# Patient Record
Sex: FEMALE | Race: WHITE | NOT HISPANIC OR LATINO | Employment: FULL TIME | URBAN - METROPOLITAN AREA
[De-identification: names, ages, dates, MRNs, and addresses within clinical notes are randomized per-mention and may not be internally consistent; named-entity substitution may affect disease eponyms.]

---

## 2017-02-23 ENCOUNTER — OFFICE VISIT (OUTPATIENT)
Dept: GASTROENTEROLOGY | Facility: CLINIC | Age: 32
End: 2017-02-23

## 2017-02-23 VITALS
HEIGHT: 69 IN | SYSTOLIC BLOOD PRESSURE: 128 MMHG | WEIGHT: 195 LBS | BODY MASS INDEX: 28.88 KG/M2 | DIASTOLIC BLOOD PRESSURE: 74 MMHG

## 2017-02-23 DIAGNOSIS — K59.04 CHRONIC IDIOPATHIC CONSTIPATION: Primary | ICD-10-CM

## 2017-02-23 DIAGNOSIS — R10.13 DYSPEPSIA: ICD-10-CM

## 2017-02-23 PROCEDURE — 99213 OFFICE O/P EST LOW 20 MIN: CPT | Performed by: INTERNAL MEDICINE

## 2017-02-23 NOTE — PROGRESS NOTES
Chief Complaint   Patient presents with   • Heartburn   • Constipation       Jenna Jones is a  32 y.o. female here for a follow up visit for heartburn and constipation. She has not had any further episodes of the band like pain.  She has taken protonix daily - unsure if it is helping.  Intermittent upper abd discomfort - recent episode involving chest discomfort. No n/v.  No fredrick heartburn.  No improvement in bowel frequency with amitiza 8 mcg bid.  Has a small BM if she drinks coffee but not much volume.  HPI  Past Medical History   Diagnosis Date   • Anxiety    • Asthma    • Chronic constipation    • Depression    • GERD (gastroesophageal reflux disease)    • H/O CT scan of abdomen 07/05/2016     unremaarkable-mild constipation     Past Surgical History   Procedure Laterality Date   • Knee surgery     • Rotator cuff repair         Current Outpatient Prescriptions:   •  buPROPion SR (WELLBUTRIN SR) 150 MG 12 hr tablet, TAKE 1 TABLET BY MOUTH TWICE DAILY OR AS DIRECTED, Disp: , Rfl: 0  •  hyoscyamine (LEVSIN) 0.125 MG SL tablet, Take 1 tablet by mouth 4 (Four) Times a Day With Meals & at Bedtime., Disp: 50 tablet, Rfl: 2  •  lubiprostone (AMITIZA) 8 MCG capsule, Take 1 capsule by mouth 2 (Two) Times a Day With Meals., Disp: 60 capsule, Rfl: 3  •  omeprazole (PriLOSEC) 20 MG capsule, TAKE ONE CAPSULE BY MOUTH EVERY DAY, Disp: , Rfl: 0  •  PROAIR  (90 BASE) MCG/ACT inhaler, INHALE TWO PUFFS BY MOUTH EVERY SIX HOURS AS NEEDED, Disp: , Rfl: 5  •  nitrofurantoin, macrocrystal-monohydrate, (MACROBID) 100 MG capsule, TAKE ONE CAPSULE BY MOUTH TWICE A DAY FOR 7 DAYS, Disp: , Rfl: 0  PRN Meds:.  Allergies   Allergen Reactions   • Adhesive Tape      Social History     Social History   • Marital status:      Spouse name: N/A   • Number of children: N/A   • Years of education: N/A     Occupational History   • Not on file.     Social History Main Topics   • Smoking status: Never Smoker   • Smokeless  tobacco: Not on file   • Alcohol use Yes      Comment: social   • Drug use: No   • Sexual activity: Defer     Other Topics Concern   • Not on file     Social History Narrative     Family History   Problem Relation Age of Onset   • Hypertension Other    • Diabetes Other    • Stomach cancer Other    • Prostate cancer Other    • Colon polyps Mother    • Green's esophagus Father    • Colon polyps Maternal Aunt    • Colon cancer Maternal Uncle    • Colon cancer Maternal Grandmother    • Colon polyps Maternal Grandmother    • Colon cancer Cousin    • Stomach cancer Paternal Grandfather      Review of Systems- reviewed and neg except hpi  Vitals:    02/23/17 1327   BP: 128/74     Last Weight    02/23/17  1327   Weight: 195 lb (88.5 kg)     Physical Exam   Constitutional: She is oriented to person, place, and time. She appears well-developed and well-nourished.   HENT:   Head: Normocephalic and atraumatic.   Eyes: Conjunctivae are normal. No scleral icterus.   Neck: Normal range of motion.   Pulmonary/Chest: Effort normal.   Abdominal: She exhibits no distension.   Musculoskeletal: She exhibits no edema.   Neurological: She is alert and oriented to person, place, and time.   Skin: No pallor.   Psychiatric: She has a normal mood and affect.   Nursing note and vitals reviewed.    No images are attached to the encounter.  Diagnoses and all orders for this visit:    Chronic idiopathic constipation    Dyspepsia    Plan:  - smaples of amitiza 24 mcg bid given - advised pt to call with progress report in a few weeks  - continue pantoprazole  - levsin prn

## 2017-02-27 ENCOUNTER — OFFICE VISIT (OUTPATIENT)
Dept: ORTHOPEDIC SURGERY | Facility: CLINIC | Age: 32
End: 2017-02-27

## 2017-02-27 VITALS — BODY MASS INDEX: 31.61 KG/M2 | HEIGHT: 67 IN | TEMPERATURE: 98.3 F | WEIGHT: 201.4 LBS

## 2017-02-27 DIAGNOSIS — G89.29 CHRONIC PAIN OF LEFT KNEE: Primary | ICD-10-CM

## 2017-02-27 DIAGNOSIS — M25.562 CHRONIC PAIN OF LEFT KNEE: Primary | ICD-10-CM

## 2017-02-27 PROCEDURE — 99212 OFFICE O/P EST SF 10 MIN: CPT | Performed by: ORTHOPAEDIC SURGERY

## 2017-02-27 RX ORDER — PANTOPRAZOLE SODIUM 40 MG/1
40 TABLET, DELAYED RELEASE ORAL
Status: ON HOLD | COMMUNITY
Start: 2016-09-03 | End: 2018-07-25

## 2017-04-13 ENCOUNTER — OFFICE VISIT (OUTPATIENT)
Dept: ORTHOPEDIC SURGERY | Facility: CLINIC | Age: 32
End: 2017-04-13

## 2017-04-13 VITALS — WEIGHT: 200 LBS | HEIGHT: 69 IN | BODY MASS INDEX: 29.62 KG/M2 | TEMPERATURE: 97.9 F

## 2017-04-13 DIAGNOSIS — M79.605 PAIN IN BOTH LOWER EXTREMITIES: Primary | ICD-10-CM

## 2017-04-13 DIAGNOSIS — M79.604 PAIN IN BOTH LOWER EXTREMITIES: Primary | ICD-10-CM

## 2017-04-13 PROCEDURE — 99213 OFFICE O/P EST LOW 20 MIN: CPT | Performed by: ORTHOPAEDIC SURGERY

## 2017-04-13 NOTE — PROGRESS NOTES
"Bilateral Knee MRI Follow Up      Patient: Jenna Jones        YOB: 1985            Chief Complaints: bilateral Knee pain      History of Present Illness: The patient is here follow-up of bilateral knee pain.  We last saw the head more patellofemoral she was getting back into exercise training stronger get her weight down she had presentation of pain in her thighs medially and anteriorly.  She states it feels like they're pulling and tight.  She does describe some cramping as well.  No new history injury change in activity other than the working out.  No gross swelling in her knees she states she did have some thigh swelling after her knee arthroscopy by Dr. Fernandes but a Doppler was done which was negative.  She does have a significant family history with multiple family members with ALS and she is concerned that this could be neurologic and some fashion      Physical Exam: 32 y.o. female  General Appearance:    Alert, cooperative, in no acute distress                   Vitals:    04/13/17 0929   Temp: 97.9 °F (36.6 °C)   Weight: 200 lb (90.7 kg)   Height: 69\" (175.3 cm)        Patient is alert and read ×3 no acute distress appears her above-listed at height weight and age.  Affect is normal respiratory rate is normal unlabored. Heart rate regular rate rhythm, sclera, dentition and hearing are normal for the purpose of this exam.      Ortho Exam Physical exam of the right knee reveals no effusion, no erythema.  The patient has no palpable tenderness along the medial joint line, no tenderness about the lateral joint line.  Patient does have crepitus with patellofemoral range of motion.  They also have subjective symptoms anteriorly during exam.  The patient has a negative bounce home, negative Bunny and a stable ligamentous exam.  Quad tone is reasonable and symmetric.  There are no overlying skin changes no lymphedema no lymphadenopathy.  There is good hip range of motion which is full " symmetric and asymptomatic and a normal ankle exam.  Hamstrings and IT band are tight bilaterally.    Physical exam of the left knee reveals no effusion, no erythema.  The patient has no palpable tenderness along the medial joint line, no tenderness about the lateral joint line.  Patient does have crepitus with patellofemoral range of motion.  They also have subjective symptoms anteriorly during exam.  The patient has a negative bounce home, negative Bunny and a stable ligamentous exam.  Quad tone is reasonable and symmetric.  There are no overlying skin changes no lymphedema no lymphadenopathy.  There is good hip range of motion which is full symmetric and asymptomatic and a normal ankle exam.  Hamstrings and IT band are tight bilaterally.            Procedures      Assessment/Plan:  Bilateral leg pain seems to be more abductor in origin she does has some tightness with stretching would like her to do see physical therapy to see if we can alleviate some of her symptoms I suspect she just did too much exercise got tight and now spasm.  If therapy does not relieve it I would consider an EMG I will see her back in 4 weeks

## 2017-10-02 ENCOUNTER — OFFICE VISIT (OUTPATIENT)
Dept: GASTROENTEROLOGY | Facility: CLINIC | Age: 32
End: 2017-10-02

## 2017-10-02 VITALS
DIASTOLIC BLOOD PRESSURE: 70 MMHG | TEMPERATURE: 98.1 F | BODY MASS INDEX: 29.95 KG/M2 | HEIGHT: 69 IN | SYSTOLIC BLOOD PRESSURE: 114 MMHG | WEIGHT: 202.2 LBS

## 2017-10-02 DIAGNOSIS — R10.13 EPIGASTRIC PAIN: Primary | ICD-10-CM

## 2017-10-02 DIAGNOSIS — R68.81 EARLY SATIETY: ICD-10-CM

## 2017-10-02 DIAGNOSIS — R11.0 NAUSEA: ICD-10-CM

## 2017-10-02 PROCEDURE — 99214 OFFICE O/P EST MOD 30 MIN: CPT | Performed by: NURSE PRACTITIONER

## 2017-10-02 RX ORDER — MELOXICAM 7.5 MG/1
7.5 TABLET ORAL DAILY
COMMUNITY
End: 2018-03-20

## 2017-10-02 NOTE — PROGRESS NOTES
"Chief Complaint   Patient presents with   • Nausea   • GI Problem     HPI    32-year-old female patient of Dr. Duarte's last evaluated in the office in February 2017.  Followed for history of GERD and prior issues related to constipation.  At her last office visit patient had ongoing issues with constipation and dose of Amitiza was increased to 24 µg twice daily.  GERD symptoms at that time were well controlled with daily PPI.    Patient presents today complaining of nausea, early satiety and bloating in the upper abdomen as well as a persistent sensation of \"something being stuck in my throat\" that it been going on for approximately 2 weeks.  She can identify no inciting event for the onset of symptoms.  She is compliant with her daily PPI.  She does report that the symptoms are better over the last 24-48 hours.  She has taken no over-the-counter medications for her symptoms.  She is currently experiencing seeing some issues related to allergies and is noted to be coughing and has some nasal congestion evident at the time of her visit.  She reports that these symptoms were present prior to the onset of her GI complaints.  She has had no vomiting, no dysphagia and no odynophagia associated with the symptoms.  She does report starting Mobic prescribed by her orthopedist for chronic knee pain.  She has been taking the medication more consistently over the last couple of weeks then she had previously which may be a contributing factor to the increase in GI symptoms.    With regard to her chronic constipation patient states that she is no longer needing to take the Amitiza.  She is having bowel movements daily with use of drinking a cup of coffee in the mornings.  She has no lower intestinal complaints.    Past Medical History:   Diagnosis Date   • Anxiety    • Asthma    • Chronic constipation    • Depression    • GERD (gastroesophageal reflux disease)    • H/O CT scan of abdomen 07/05/2016    unremaarkable-mild " constipation     Past Surgical History:   Procedure Laterality Date   • KNEE SURGERY     • ROTATOR CUFF REPAIR         Current Outpatient Prescriptions   Medication Sig Dispense Refill   • buPROPion SR (WELLBUTRIN SR) 150 MG 12 hr tablet TAKE 1 TABLET BY MOUTH TWICE DAILY OR AS DIRECTED  0   • meloxicam (MOBIC) 7.5 MG tablet Take 7.5 mg by mouth Daily.     • pantoprazole (PROTONIX) 40 MG EC tablet Take 40 mg by mouth.     • PROAIR  (90 BASE) MCG/ACT inhaler INHALE TWO PUFFS BY MOUTH EVERY SIX HOURS AS NEEDED  5     No current facility-administered medications for this visit.        PRN Meds:.    Allergies   Allergen Reactions   • Adhesive Tape        Social History     Social History   • Marital status:      Spouse name: N/A   • Number of children: N/A   • Years of education: N/A     Occupational History   • Not on file.     Social History Main Topics   • Smoking status: Never Smoker   • Smokeless tobacco: Not on file   • Alcohol use Yes      Comment: social   • Drug use: No   • Sexual activity: Defer     Other Topics Concern   • Not on file     Social History Narrative       Family History   Problem Relation Age of Onset   • Hypertension Other    • Diabetes Other    • Stomach cancer Other    • Prostate cancer Other    • Colon polyps Mother    • Green's esophagus Father    • Colon polyps Maternal Aunt    • Colon cancer Maternal Uncle    • Colon cancer Maternal Grandmother    • Colon polyps Maternal Grandmother    • Colon cancer Cousin    • Stomach cancer Paternal Grandfather    • No Known Problems Sister    • No Known Problems Brother    • No Known Problems Paternal Aunt    • No Known Problems Paternal Uncle    • No Known Problems Maternal Grandfather    • No Known Problems Paternal Grandmother    • Celiac disease Neg Hx    • Cirrhosis Neg Hx    • Crohn's disease Neg Hx    • Cystic fibrosis Neg Hx    • Esophageal cancer Neg Hx    • Hemochromatosis Neg Hx    • Inflammatory bowel disease Neg Hx    •  "Irritable bowel syndrome Neg Hx    • Liver cancer Neg Hx    • Liver disease Neg Hx    • Rectal cancer Neg Hx    • Ulcerative colitis Neg Hx    • Herber's disease Neg Hx    • Alcohol abuse Neg Hx    • Pancreatitis Neg Hx        Review of Systems   Constitutional: Negative for activity change, appetite change and unexpected weight change.   HENT: Negative for trouble swallowing.         Cough, chest congestion  Post nasal drip   Gastrointestinal: Positive for nausea. Negative for abdominal distention, abdominal pain and vomiting.        Early satiety  Upper abdominal bloating   Musculoskeletal: Positive for arthralgias.   Allergic/Immunologic: Negative for immunocompromised state.       Vitals:    10/02/17 0929   BP: 114/70   Temp: 98.1 °F (36.7 °C)     /70 (BP Location: Left arm, Patient Position: Sitting)  Temp 98.1 °F (36.7 °C) (Oral)   Ht 69\" (175.3 cm)  Wt 202 lb 3.2 oz (91.7 kg)  BMI 29.86 kg/m2       Physical Exam   Constitutional: She is oriented to person, place, and time. She appears well-developed and well-nourished. No distress.   HENT:   Head: Normocephalic and atraumatic.   Mouth/Throat: Oropharynx is clear and moist.   Eyes: No scleral icterus.   Neck: No thyromegaly present.   Cardiovascular: Normal rate and regular rhythm.    Pulmonary/Chest: Effort normal and breath sounds normal.   Abdominal: Soft. Bowel sounds are normal. She exhibits no distension. There is no tenderness. There is rebound. There is no guarding.   Neurological: She is alert and oriented to person, place, and time.   Skin: Skin is warm and dry.   Psychiatric: She has a normal mood and affect.   Vitals reviewed.      ASSESSMENT AND PLAN    Jenna was seen today for nausea and gi problem.    Diagnoses and all orders for this visit:    Epigastric pain  Comments:  x 2 weeks, some nausea, early satiety, bloated upper abdomen, globus sensation.  Compliant with daily PPI.  No dysphagia or odynophagia.  Orders:  -     Case " Request; Standing  -     Case Request    Early satiety  -     Case Request; Standing  -     Case Request    Nausea  -     Case Request; Standing  -     Case Request    Other orders  -     Obtain informed consent; Standing    Patient will continue her current PPI.  We will schedule her for an EGD but if symptoms continue to improve she is certainly welcome to cancel the procedure and follow-up on an as-needed basis for any recurrence.  While she is on prescription NSAIDs I have encouraged her to take the medications with food and at as low doses as possible to avoid any GI related issues         DERRELL Hernandes   [facility]  [address] [place] [address]  Office: [phone]

## 2018-01-22 ENCOUNTER — OFFICE VISIT (OUTPATIENT)
Dept: ORTHOPEDIC SURGERY | Facility: CLINIC | Age: 33
End: 2018-01-22

## 2018-01-22 DIAGNOSIS — M25.562 CHRONIC PAIN OF LEFT KNEE: Primary | ICD-10-CM

## 2018-01-22 DIAGNOSIS — G89.29 CHRONIC PAIN OF LEFT KNEE: Primary | ICD-10-CM

## 2018-01-22 DIAGNOSIS — S83.282D TEAR OF LATERAL MENISCUS OF LEFT KNEE, CURRENT, UNSPECIFIED TEAR TYPE, SUBSEQUENT ENCOUNTER: ICD-10-CM

## 2018-01-22 PROCEDURE — 73562 X-RAY EXAM OF KNEE 3: CPT | Performed by: ORTHOPAEDIC SURGERY

## 2018-01-22 PROCEDURE — 99213 OFFICE O/P EST LOW 20 MIN: CPT | Performed by: ORTHOPAEDIC SURGERY

## 2018-01-22 NOTE — PROGRESS NOTES
Knee Follow Up      Patient: Jenna Jones        YOB: 1985            Chief Complaints: knee pain bilaterally left greater than right also some left hip pain      History of Present Illness: Patient is here follow-up of bilateral knee pain we did undertake conservative management she she's really no better in fact her symptoms are somewhat worse moderate to severe intermittent she was complaining of some numbness and tingling she had a little bit this left I do not think is related a way to her knee could be related to her back we discussed options including seeing Dr. Porter a neurologist and/or getting a nerve test      Physical Exam: 32 y.o. female  General Appearance:    Alert, cooperative, in no acute distress                 There were no vitals filed for this visit.     Patient is alert and read ×3 no acute distress appears her above-listed at height weight and age.  Affect is normal respiratory rate is normal unlabored. Heart rate regular rate rhythm, sclera, dentition and hearing are normal for the purpose of this exam.      Ortho Exam     Physical exam of the right knee reveals no effusion, no erythema.  The patient has no palpable tenderness along the medial joint line, no tenderness about the lateral joint line.  Patient does have crepitus with patellofemoral range of motion.  They also have subjective symptoms anteriorly during exam.  The patient has a negative bounce home, negative Bunny and a stable ligamentous exam.  Quad tone is reasonable and symmetric.  There are no overlying skin changes no lymphedema no lymphadenopathy.  There is good hip range of motion which is full symmetric and asymptomatic and a normal ankle exam.  Hamstrings and IT band are tight bilaterally.    Physical exam of the left knee reveals no effusion no redness.  The patient does have tenderness about the Lateral joint line.  Minimal tenderness about the medial joint line.  A negative bounce home and a  positive medial Bunny.  There is some pain medially  with a lateral Bunny.  Patient has a stable ligamentous exam.  Quads are reasonable and symmetric bilaterally.  Calf is soft and nontender.  There is no overlying skin changes no lymphedema lymphadenopathy.  Patient has good hip range of motion full symmetric and asymptomatic and a normal ankle exam.      X-rays AP lateral merchant view left knee were taken to evaluate her symptoms and have have compared to previous films.  She does have degenerative changes laterally right is greater than left with squaring off of the lateral femoral condyle she also has patella baja.  No evidence of any acute bony pathology      Assessment/Plan:      Persistent left knee pain despite conservative measures I'm concerned about meniscal pathology plan is to proceed with an MRI of her follow-up after that test

## 2018-02-01 ENCOUNTER — TELEPHONE (OUTPATIENT)
Dept: ORTHOPEDIC SURGERY | Facility: CLINIC | Age: 33
End: 2018-02-01

## 2018-02-27 ENCOUNTER — TELEPHONE (OUTPATIENT)
Dept: ORTHOPEDIC SURGERY | Facility: CLINIC | Age: 33
End: 2018-02-27

## 2018-03-01 NOTE — TELEPHONE ENCOUNTER
I think I would have her see Dr. Porter if we could set that appointment up her we did a nerve test  
LEFT MESSAGE FOR PT TO CALL BACK MG  
SPOKE WITH PT IN DETAIL RESULTS MG  
IV

## 2018-03-20 ENCOUNTER — CONSULT (OUTPATIENT)
Dept: ORTHOPEDIC SURGERY | Facility: CLINIC | Age: 33
End: 2018-03-20

## 2018-03-20 VITALS — TEMPERATURE: 97.9 F | BODY MASS INDEX: 33.06 KG/M2 | HEIGHT: 69 IN | WEIGHT: 223.2 LBS

## 2018-03-20 DIAGNOSIS — M54.50 LUMBAR SPINE PAIN: Primary | ICD-10-CM

## 2018-03-20 DIAGNOSIS — M54.5 CHRONIC LOW BACK PAIN, UNSPECIFIED BACK PAIN LATERALITY, WITH SCIATICA PRESENCE UNSPECIFIED: ICD-10-CM

## 2018-03-20 DIAGNOSIS — G89.29 CHRONIC LOW BACK PAIN, UNSPECIFIED BACK PAIN LATERALITY, WITH SCIATICA PRESENCE UNSPECIFIED: ICD-10-CM

## 2018-03-20 PROCEDURE — 72100 X-RAY EXAM L-S SPINE 2/3 VWS: CPT | Performed by: ORTHOPAEDIC SURGERY

## 2018-03-20 PROCEDURE — 99213 OFFICE O/P EST LOW 20 MIN: CPT | Performed by: ORTHOPAEDIC SURGERY

## 2018-03-20 RX ORDER — FERROUS SULFATE 325(65) MG
325 TABLET ORAL
COMMUNITY
End: 2019-12-16

## 2018-03-20 NOTE — PROGRESS NOTES
New patient or new problem visit    Chief Complaint   Patient presents with   • Lumbar Spine - Establish Care, Pain       HPI: She complains of chronic low back pain occasionally radiating to the leg moderate constant aching worse with activity.  Physical therapy a year ago did not help    PFSH: See chart- reviewed    Review of Systems   Constitutional: Negative for activity change, appetite change, chills, diaphoresis, fatigue, fever and unexpected weight change.   HENT: Negative for congestion, hearing loss, nosebleeds, postnasal drip, sore throat, tinnitus and trouble swallowing.    Eyes: Negative for pain and visual disturbance.   Respiratory: Negative for apnea, cough, chest tightness, shortness of breath and wheezing.    Cardiovascular: Negative for chest pain and palpitations.   Gastrointestinal: Positive for abdominal pain. Negative for blood in stool, diarrhea, nausea and vomiting.   Genitourinary: Negative for difficulty urinating and dysuria.   Musculoskeletal: Positive for arthralgias, back pain, joint swelling and myalgias.   Skin: Negative for color change and rash.   Neurological: Positive for numbness. Negative for light-headedness and headaches.   Hematological: Bruises/bleeds easily.   Psychiatric/Behavioral: Negative for agitation and sleep disturbance. The patient is not nervous/anxious.        PE: Constitutional: Vital signs above-noted.  Awake, alert and oriented    Psychiatric: Affect and insight do not appear grossly disturbed.    Pulmonary: Breathing is unlabored, color is good.    Skin: Warm, dry and normal turgor    Cardiac:  pedal pulses intact.  No edema.    Eyesight and hearing appear adequate for examination purposes      Musculoskeletal:  There is no tenderness to percussion and palpation of the spine. Motion appears undisturbed.  Posture is unremarkable to coronal and sagittal inspection.    The skin about the area is intact.  There is no palpable or visible deformity.  There is no  local spasm.       Neurologic:   Reflexes are 2+ and symmetrical in the patellae unobtainable in the Achilles.   Motor function is undisturbed in quadriceps, EHL, and gastrocnemius      Sensation appears symmetrically intact to light touch   .  In the bilateral lower extremities there is no evidence of atrophy.   Clonus is absent..  Gait appears undisturbed.  SLR test negative      MEDICAL DECISION MAKING    XRAY: Plain film x-rays of the lumbar spine show some slight disc space narrowing at L5-S1, a little bit less at L4 5.  No comparison views are available.    Other: n/a    Impression: Intractable lumbar back pain which she states is the worst pain she has.    Plan: This point is get an MRI scan of the lumbar spine with perhaps with night toward epidural injections which I explained.

## 2018-03-28 ENCOUNTER — TELEPHONE (OUTPATIENT)
Dept: ORTHOPEDIC SURGERY | Facility: CLINIC | Age: 33
End: 2018-03-28

## 2018-03-28 NOTE — TELEPHONE ENCOUNTER
If this is for the MRI of her back we please asked Dr. Porter what he wants to do with regard to therapy

## 2018-03-28 NOTE — TELEPHONE ENCOUNTER
Yes. She would like a referral for Pt.  She also asked that I document that she had GI problems from [facility], so gastro doc told her to stop it.  She can, however, tolerate ibuprofen.She wanted this documented as part of her conservative care requirements from [place].,    She asked me to mail her the order for Pt.

## 2018-03-29 ENCOUNTER — TELEPHONE (OUTPATIENT)
Dept: ORTHOPEDIC SURGERY | Facility: CLINIC | Age: 33
End: 2018-03-29

## 2018-03-29 DIAGNOSIS — M25.569 ACUTE KNEE PAIN, UNSPECIFIED LATERALITY: Primary | ICD-10-CM

## 2018-03-29 DIAGNOSIS — M54.50 LUMBAR SPINE PAIN: Primary | ICD-10-CM

## 2018-03-29 NOTE — TELEPHONE ENCOUNTER
She also asked Dr. Woodall to give her a referral for her lumbar spine, as [place] also requires conservative care for that.      He put in his referral, but I need one from you for her knee.      She requested I mail these to her, and she is not going thru the [facility] system for her Pt. She has a physical therapist elsewhere.

## 2018-04-06 ENCOUNTER — TELEPHONE (OUTPATIENT)
Dept: GASTROENTEROLOGY | Facility: CLINIC | Age: 33
End: 2018-04-06

## 2018-04-06 NOTE — TELEPHONE ENCOUNTER
----- Message from Miguel Craven sent at 4/6/2018  8:08 AM EDT -----  Regarding: NOT FEELING WELL   Contact: [phone]  PT CALLED COMPLAIN OF CHANGES IN BOWELS AND ASKING FOR A EGD

## 2018-04-06 NOTE — TELEPHONE ENCOUNTER
"Call from pt.  States PCP started her on iron because of anemia/fatigue.  Also started on Amoxicillin last week 2nd sinus infection.  Notes that BM has layer of \"grit\" - normal brown color.     States would like to proceed with EGD as instructed with o/v of 10/2/17.  Asking if should also have c/s.    Advise pt that DR Duarte out of office - will send message.  Verb understanding.  "

## 2018-04-12 NOTE — TELEPHONE ENCOUNTER
Please see if she can be worked in with [facility] for further evaluation of her symptoms prior to scheduling any further endoscopic procedures.

## 2018-06-05 ENCOUNTER — OFFICE VISIT (OUTPATIENT)
Dept: GASTROENTEROLOGY | Facility: CLINIC | Age: 33
End: 2018-06-05

## 2018-06-05 VITALS
SYSTOLIC BLOOD PRESSURE: 122 MMHG | WEIGHT: 226 LBS | HEIGHT: 69 IN | TEMPERATURE: 98.3 F | DIASTOLIC BLOOD PRESSURE: 78 MMHG | BODY MASS INDEX: 33.47 KG/M2

## 2018-06-05 DIAGNOSIS — K21.9 GASTROESOPHAGEAL REFLUX DISEASE, ESOPHAGITIS PRESENCE NOT SPECIFIED: Primary | ICD-10-CM

## 2018-06-05 DIAGNOSIS — Z80.0 FAMILY HISTORY OF STOMACH CANCER: ICD-10-CM

## 2018-06-05 DIAGNOSIS — R19.4 CHANGE IN BOWEL HABITS: ICD-10-CM

## 2018-06-05 DIAGNOSIS — K62.5 RECTAL BLEEDING: ICD-10-CM

## 2018-06-05 DIAGNOSIS — R09.89 GLOBUS SENSATION: ICD-10-CM

## 2018-06-05 DIAGNOSIS — Z83.71 FAMILY HISTORY OF POLYPS IN THE COLON: ICD-10-CM

## 2018-06-05 PROBLEM — R10.13 EPIGASTRIC PAIN: Status: ACTIVE | Noted: 2018-06-05

## 2018-06-05 PROBLEM — R68.81 EARLY SATIETY: Status: ACTIVE | Noted: 2018-06-05

## 2018-06-05 PROBLEM — R11.0 NAUSEA: Status: ACTIVE | Noted: 2018-06-05

## 2018-06-05 PROCEDURE — 99214 OFFICE O/P EST MOD 30 MIN: CPT | Performed by: NURSE PRACTITIONER

## 2018-06-05 RX ORDER — CETIRIZINE HYDROCHLORIDE 10 MG/1
10 TABLET ORAL DAILY
COMMUNITY

## 2018-06-05 RX ORDER — MONTELUKAST SODIUM 10 MG/1
TABLET ORAL
COMMUNITY
Start: 2018-05-28 | End: 2019-12-16

## 2018-06-05 NOTE — PROGRESS NOTES
Chief Complaint   Patient presents with   • Pre-op Exam   • Rectal Bleeding   • Heartburn       Jenna Jones is a  33 y.o. female here for a follow up visit for rectal bleeding and GERD.     HPI  32 yo f presents today for follow up visit for rectal bleeding, change in bowel habits with GERD. She is a patient of Dr. Duarte. She was last seen in the office on 10/2017. She has hx GERD and admits the protonix is not working that well for her anymore. She was scheduled for EGD last fall but cancelled it because at that time she was doing better. She is still having episodes of globus sensation with epigastric discomfort. She admits her father has hx Green's Esophagus. She has never had EGD or Colonoscopy in the past. She does report 1 episode of BRBPR when she wiped several weeks ago. She denies any hx hemorrhoids. She admits the bleeding was painless. She reports her bowels have changed recently. She used to have a solid BM and would have to go right after a meal. Now she admits the stools can look like coffee grounds in the bottom of the bowl or just loose. She admits the color has changed from brown to jamir colored. There was one episode the stool was black. She denies any dysphagia, abd pain, N&V or constipation. She admits her appetite is good and her weight is stable.     Past Medical History:   Diagnosis Date   • Anemia    • Anxiety    • Asthma    • Chronic constipation    • Depression    • GERD (gastroesophageal reflux disease)    • H/O CT scan of abdomen 07/05/2016    unremaarkable-mild constipation       Past Surgical History:   Procedure Laterality Date   • CERVICAL CONIZATION     • KNEE SURGERY     • LIPOMA EXCISION      ankle   • ROTATOR CUFF REPAIR         Scheduled Meds:    Continuous Infusions:  No current facility-administered medications for this visit.     PRN Meds:.    Allergies   Allergen Reactions   • Adhesive Tape        Social History     Social History   • Marital status:       Spouse name: N/A   • Number of children: N/A   • Years of education: N/A     Occupational History   • Not on file.     Social History Main Topics   • Smoking status: Never Smoker   • Smokeless tobacco: Never Used   • Alcohol use Yes      Comment: social   • Drug use: No   • Sexual activity: Defer     Other Topics Concern   • Not on file     Social History Narrative   • No narrative on file       Family History   Problem Relation Age of Onset   • Hypertension Other    • Diabetes Other    • Stomach cancer Other    • Prostate cancer Other    • Colon polyps Mother    • Green's esophagus Father    • Colon polyps Maternal Aunt    • Colon cancer Maternal Uncle    • Colon cancer Maternal Grandmother    • Colon polyps Maternal Grandmother    • Colon cancer Cousin    • Stomach cancer Paternal Grandfather    • No Known Problems Sister    • No Known Problems Brother    • No Known Problems Paternal Aunt    • No Known Problems Paternal Uncle    • No Known Problems Maternal Grandfather    • No Known Problems Paternal Grandmother    • Celiac disease Neg Hx    • Cirrhosis Neg Hx    • Crohn's disease Neg Hx    • Cystic fibrosis Neg Hx    • Esophageal cancer Neg Hx    • Hemochromatosis Neg Hx    • Inflammatory bowel disease Neg Hx    • Irritable bowel syndrome Neg Hx    • Liver cancer Neg Hx    • Liver disease Neg Hx    • Rectal cancer Neg Hx    • Ulcerative colitis Neg Hx    • Herber's disease Neg Hx    • Alcohol abuse Neg Hx    • Pancreatitis Neg Hx    • Anesthesia problems Neg Hx    • Broken bones Neg Hx    • Cancer Neg Hx    • Clotting disorder Neg Hx    • Collagen disease Neg Hx    • Dislocations Neg Hx    • Osteoporosis Neg Hx    • Rheumatologic disease Neg Hx    • Scoliosis Neg Hx    • Severe sprains Neg Hx        Review of Systems   Constitutional: Negative for appetite change, chills, diaphoresis, fatigue, fever and unexpected weight change.   HENT: Negative for nosebleeds, postnasal drip, sore throat, trouble swallowing  and voice change.    Respiratory: Negative for cough, choking, chest tightness, shortness of breath and wheezing.    Cardiovascular: Negative for chest pain.   Gastrointestinal: Positive for abdominal distention. Negative for abdominal pain, anal bleeding, blood in stool, constipation, diarrhea, nausea, rectal pain and vomiting.   Endocrine: Negative for polydipsia, polyphagia and polyuria.   Musculoskeletal: Negative for gait problem.   Skin: Negative for rash and wound.   Allergic/Immunologic: Negative for food allergies.   Neurological: Negative for dizziness, speech difficulty and light-headedness.   Psychiatric/Behavioral: Negative for confusion, self-injury, sleep disturbance and suicidal ideas.       Vitals:    06/05/18 1504   BP: 122/78   Temp: 98.3 °F (36.8 °C)       Physical Exam   Constitutional: She is oriented to person, place, and time. She appears well-developed and well-nourished. She does not appear ill. No distress.   HENT:   Head: Normocephalic.   Eyes: Pupils are equal, round, and reactive to light.   Cardiovascular: Normal rate, regular rhythm and normal heart sounds.    Pulmonary/Chest: Effort normal and breath sounds normal.   Abdominal: Soft. Bowel sounds are normal. She exhibits no distension and no mass. There is no hepatosplenomegaly. There is no tenderness. There is no rebound and no guarding. No hernia.   Musculoskeletal: Normal range of motion.   Neurological: She is alert and oriented to person, place, and time.   Skin: Skin is warm and dry.   Psychiatric: She has a normal mood and affect. Her speech is normal and behavior is normal. Judgment normal.       No images are attached to the encounter.    Assessment & Plan     1. Gastroesophageal reflux disease, esophagitis presence not specified  - Case Request; Standing  - Case Request    2. Globus sensation  - Case Request; Standing  - Case Request    3. Rectal bleeding  - Case Request; Standing  - Case Request    4. Change in bowel  habits  - Case Request; Standing  - Case Request    5. Family history of polyps in the colon  - Case Request; Standing  - Case Request    6. Family history of stomach cancer  - Case Request; Standing  - Case Request    Given her FH and current symptoms recommend EGD and Colonoscopy with Dr. Duarte for further evaluation. Continue protonix for now. I discussed the potential risks involved with the procedure and patient is agreeable to proceed.

## 2018-07-25 ENCOUNTER — ANESTHESIA (OUTPATIENT)
Dept: GASTROENTEROLOGY | Facility: HOSPITAL | Age: 33
End: 2018-07-25

## 2018-07-25 ENCOUNTER — ANESTHESIA EVENT (OUTPATIENT)
Dept: GASTROENTEROLOGY | Facility: HOSPITAL | Age: 33
End: 2018-07-25

## 2018-07-25 ENCOUNTER — HOSPITAL ENCOUNTER (OUTPATIENT)
Facility: HOSPITAL | Age: 33
Setting detail: HOSPITAL OUTPATIENT SURGERY
Discharge: HOME | End: 2018-07-25
Attending: INTERNAL MEDICINE | Admitting: INTERNAL MEDICINE

## 2018-07-25 VITALS
OXYGEN SATURATION: 100 % | WEIGHT: 223.19 LBS | BODY MASS INDEX: 32.96 KG/M2 | TEMPERATURE: 98.4 F | RESPIRATION RATE: 18 BRPM | HEART RATE: 68 BPM | DIASTOLIC BLOOD PRESSURE: 75 MMHG | SYSTOLIC BLOOD PRESSURE: 127 MMHG

## 2018-07-25 DIAGNOSIS — R10.13 EPIGASTRIC PAIN: ICD-10-CM

## 2018-07-25 DIAGNOSIS — R68.81 EARLY SATIETY: ICD-10-CM

## 2018-07-25 DIAGNOSIS — R11.0 NAUSEA: ICD-10-CM

## 2018-07-25 LAB
B-HCG UR QL: NEGATIVE
INTERNAL NEGATIVE CONTROL: NEGATIVE
INTERNAL POSITIVE CONTROL: POSITIVE
Lab: NORMAL

## 2018-07-25 PROCEDURE — 88305 TISSUE EXAM BY PATHOLOGIST: CPT | Performed by: INTERNAL MEDICINE

## 2018-07-25 PROCEDURE — 43239 EGD BIOPSY SINGLE/MULTIPLE: CPT | Performed by: INTERNAL MEDICINE

## 2018-07-25 PROCEDURE — S0260 H&P FOR SURGERY: HCPCS | Performed by: INTERNAL MEDICINE

## 2018-07-25 PROCEDURE — 88312 SPECIAL STAINS GROUP 1: CPT | Performed by: INTERNAL MEDICINE

## 2018-07-25 PROCEDURE — 45380 COLONOSCOPY AND BIOPSY: CPT | Performed by: INTERNAL MEDICINE

## 2018-07-25 PROCEDURE — 25010000002 PROPOFOL 10 MG/ML EMULSION: Performed by: ANESTHESIOLOGY

## 2018-07-25 PROCEDURE — 81025 URINE PREGNANCY TEST: CPT | Performed by: INTERNAL MEDICINE

## 2018-07-25 RX ORDER — SODIUM CHLORIDE, SODIUM LACTATE, POTASSIUM CHLORIDE, CALCIUM CHLORIDE 600; 310; 30; 20 MG/100ML; MG/100ML; MG/100ML; MG/100ML
1000 INJECTION, SOLUTION INTRAVENOUS CONTINUOUS
Status: DISCONTINUED | OUTPATIENT
Start: 2018-07-25 | End: 2018-07-25 | Stop reason: HOSPADM

## 2018-07-25 RX ORDER — PANTOPRAZOLE SODIUM 40 MG/1
40 TABLET, DELAYED RELEASE ORAL
Qty: 60 TABLET | Refills: 3 | Status: SHIPPED | OUTPATIENT
Start: 2018-07-25 | End: 2019-02-20 | Stop reason: SDUPTHER

## 2018-07-25 RX ORDER — SUCRALFATE 1 G/1
1 TABLET ORAL 2 TIMES DAILY
Qty: 60 TABLET | Refills: 2 | Status: SHIPPED | OUTPATIENT
Start: 2018-07-25 | End: 2019-02-20

## 2018-07-25 RX ORDER — PROPOFOL 10 MG/ML
VIAL (ML) INTRAVENOUS AS NEEDED
Status: DISCONTINUED | OUTPATIENT
Start: 2018-07-25 | End: 2018-07-25 | Stop reason: SURG

## 2018-07-25 RX ORDER — PROPOFOL 10 MG/ML
VIAL (ML) INTRAVENOUS CONTINUOUS PRN
Status: DISCONTINUED | OUTPATIENT
Start: 2018-07-25 | End: 2018-07-25 | Stop reason: SURG

## 2018-07-25 RX ORDER — LIDOCAINE HYDROCHLORIDE 20 MG/ML
INJECTION, SOLUTION INFILTRATION; PERINEURAL AS NEEDED
Status: DISCONTINUED | OUTPATIENT
Start: 2018-07-25 | End: 2018-07-25 | Stop reason: SURG

## 2018-07-25 RX ADMIN — LIDOCAINE HYDROCHLORIDE 50 MG: 20 INJECTION, SOLUTION INFILTRATION; PERINEURAL at 08:43

## 2018-07-25 RX ADMIN — SODIUM CHLORIDE, POTASSIUM CHLORIDE, SODIUM LACTATE AND CALCIUM CHLORIDE 1000 ML: 600; 310; 30; 20 INJECTION, SOLUTION INTRAVENOUS at 08:31

## 2018-07-25 RX ADMIN — PROPOFOL 250 MG: 10 INJECTION, EMULSION INTRAVENOUS at 08:44

## 2018-07-25 RX ADMIN — PROPOFOL 160 MCG/KG/MIN: 10 INJECTION, EMULSION INTRAVENOUS at 08:47

## 2018-07-25 RX ADMIN — SODIUM CHLORIDE, POTASSIUM CHLORIDE, SODIUM LACTATE AND CALCIUM CHLORIDE: 600; 310; 30; 20 INJECTION, SOLUTION INTRAVENOUS at 08:38

## 2018-07-25 NOTE — ANESTHESIA POSTPROCEDURE EVALUATION
Patient: Jnena Jones    Procedure Summary     Date:  07/25/18 Room / Location:   [facility] /  [facility]    Anesthesia Start:  0840 Anesthesia Stop:  0915    Procedures:       ESOPHAGOGASTRODUODENOSCOPY WITH BIOPSY (N/A Esophagus)      COLONOSCOPY TO CECUM/TI WITH BIOPSY (N/A ) Diagnosis:       Early satiety      Nausea      Epigastric pain      (Early satiety [R68.81])      (Nausea [R11.0])      (Epigastric pain [R10.13])    Surgeon:  Donna Duarte MD Provider:  Josselyn Kumar MD    Anesthesia Type:  MAC ASA Status:  2          Anesthesia Type: MAC  Last vitals  BP   129/93 (07/25/18 0929)   Temp   36.9 °C (98.4 °F) (07/25/18 0929)   Pulse   70 (07/25/18 0929)   Resp   20 (07/25/18 0929)     SpO2   99 % (07/25/18 0929)     Post Anesthesia Care and Evaluation    Patient location during evaluation: bedside  Patient participation: complete - patient participated  Level of consciousness: awake  Pain score: 0  Pain management: adequate  Airway patency: patent  Anesthetic complications: No anesthetic complications    Cardiovascular status: acceptable  Respiratory status: acceptable  Hydration status: acceptable    Comments: Blood pressure 129/93, pulse 70, temperature 36.9 °C (98.4 °F), resp. rate 20, weight 101 kg (223 lb 3 oz), SpO2 99 %.    No anesthesia care post op

## 2018-07-25 NOTE — ANESTHESIA PREPROCEDURE EVALUATION
Anesthesia Evaluation     Patient summary reviewed and Nursing notes reviewed   NPO Solid Status: > 8 hours  NPO Liquid Status: > 2 hours           Airway   Mallampati: III  TM distance: <3 FB  Neck ROM: full  Possible difficult intubation  Dental - normal exam     Pulmonary - normal exam    breath sounds clear to auscultation  (+) asthma,   Cardiovascular - negative cardio ROS and normal exam    Rhythm: regular  Rate: normal        Neuro/Psych  (+) psychiatric history Anxiety,     GI/Hepatic/Renal/Endo    (+) obesity,  GERD,      Musculoskeletal (-) negative ROS    Abdominal   (+) obese,    Substance History - negative use     OB/GYN negative ob/gyn ROS         Other - negative ROS                     Anesthesia Plan    ASA 2     MAC     intravenous induction   Anesthetic plan and risks discussed with patient.

## 2018-07-25 NOTE — ADDENDUM NOTE
Addendum  created 07/25/18 1536 by Josselyn Kumar MD    Anesthesia Review and Sign - Ready for Procedure, Anesthesia Review and Sign - Signed

## 2018-07-26 LAB
CYTO UR: NORMAL
LAB AP CASE REPORT: NORMAL
PATH REPORT.FINAL DX SPEC: NORMAL
PATH REPORT.GROSS SPEC: NORMAL

## 2018-08-07 ENCOUNTER — TELEPHONE (OUTPATIENT)
Dept: GASTROENTEROLOGY | Facility: CLINIC | Age: 33
End: 2018-08-07

## 2018-08-07 NOTE — TELEPHONE ENCOUNTER
bisopsies from recent scopes were unremarkable - no sig inflammation.  rec meds as prescribed at the time of scopes.  Office f/u 6-8 weeks with me or gregg

## 2018-08-10 NOTE — TELEPHONE ENCOUNTER
Pt called and advised per Dr Duarte that the bx from recent scopes were unremarkable - no significant inflammation.  She recommends meds as prescribed at the time of scopes.  F/u in 6-8 wks with her or Callie. Pt verb understanding and made appt for 10/10 at 330pm with Callie.

## 2018-08-30 ENCOUNTER — TELEPHONE (OUTPATIENT)
Dept: GASTROENTEROLOGY | Facility: CLINIC | Age: 33
End: 2018-08-30

## 2018-08-30 NOTE — TELEPHONE ENCOUNTER
Call to pt . Advise per DR Duarte that anusol cream sent to pharmacy.  Can apply up to 4x/day fo rectal/perianal discomfort.  Pt verb understanding.

## 2018-08-30 NOTE — TELEPHONE ENCOUNTER
----- Message from Miguel Craven sent at 8/30/2018 10:45 AM EDT -----  Regarding: chnages in bowels and asking for med   Contact: [phone]  Pt called complain of changes in bowels, and having hard time sitting down..

## 2018-08-30 NOTE — TELEPHONE ENCOUNTER
Call from pt.  States for past 3 days has noted diarrhea - 1-3x/day - no blood.  States otherwise does not feel sick, but this has irritated her bottom.  Difficult to sit.  Notes that c/s op note showed erosions.  Asking for rx to manage pain.    [facility] pharmacy verified -  Message to DR Duarte.

## 2019-02-20 ENCOUNTER — OFFICE VISIT (OUTPATIENT)
Dept: GASTROENTEROLOGY | Facility: CLINIC | Age: 34
End: 2019-02-20

## 2019-02-20 VITALS
SYSTOLIC BLOOD PRESSURE: 118 MMHG | TEMPERATURE: 98.6 F | HEIGHT: 68 IN | BODY MASS INDEX: 33.68 KG/M2 | DIASTOLIC BLOOD PRESSURE: 70 MMHG | WEIGHT: 222.2 LBS

## 2019-02-20 DIAGNOSIS — K21.9 GASTROESOPHAGEAL REFLUX DISEASE WITHOUT ESOPHAGITIS: Primary | ICD-10-CM

## 2019-02-20 DIAGNOSIS — R19.7 DIARRHEA, UNSPECIFIED TYPE: ICD-10-CM

## 2019-02-20 DIAGNOSIS — L29.0 RECTAL ITCHING: ICD-10-CM

## 2019-02-20 PROCEDURE — 99214 OFFICE O/P EST MOD 30 MIN: CPT | Performed by: NURSE PRACTITIONER

## 2019-02-20 RX ORDER — PANTOPRAZOLE SODIUM 40 MG/1
40 TABLET, DELAYED RELEASE ORAL
Qty: 180 TABLET | Refills: 3 | Status: SHIPPED | OUTPATIENT
Start: 2019-02-20 | End: 2020-03-17

## 2019-02-20 NOTE — PROGRESS NOTES
Chief Complaint   Patient presents with   • Follow-up       Jenna Jones is a  34 y.o. female here for a follow up visit for GERD.    HPI  35 yo f presents today for follow up visit for GERD, change in bowel habits and rectal bleeding. She is a patient of Dr. Duarte. She was last seen in the office on 6/2018. She underwent EGD and colonoscopy on 7/25/18. EGD showed some gastritis and small HH. Path was negative. Colonoscopy showed a few erosions at 45 cm proximal to the anus otherwise normal. She admits she did have some anal itching and some scant rectal bleeding right after the scopes but for the most part she has done really well. She admits the rectal bleeding has resolved. She still has some anal itching from time to time but she uses the HC cream and it helps. She has hx GERD and admits the protonix 40 mg BID works well for her. She takes it BID most days but admits there are some days she forgets and only takes it daily. She denies any dysphagia, reflux, abd pain, N&V, diarrhea, constipation, rectal bleeding or melena. She admits her appetite is good and her weight is stable.     Past Medical History:   Diagnosis Date   • Anemia    • Anxiety    • Asthma    • Chronic constipation    • Depression    • GERD (gastroesophageal reflux disease)    • H/O CT scan of abdomen 07/05/2016    unremaarkable-mild constipation       Past Surgical History:   Procedure Laterality Date   • CERVICAL CONIZATION     • COLONOSCOPY N/A 7/25/2018    A few erosions at 45 cm proximal to the anus   • ENDOSCOPY N/A 7/25/2018    Small hiatal hernia, gastritis   • KNEE SURGERY     • LIPOMA EXCISION      ankle   • ROTATOR CUFF REPAIR         Scheduled Meds:    Continuous Infusions:  No current facility-administered medications for this visit.     PRN Meds:.    Allergies   Allergen Reactions   • Adhesive Tape        Social History     Socioeconomic History   • Marital status:      Spouse name: Not on file   • Number of  children: Not on file   • Years of education: Not on file   • Highest education level: Not on file   Social Needs   • Financial resource strain: Not on file   • Food insecurity - worry: Not on file   • Food insecurity - inability: Not on file   • Transportation needs - medical: Not on file   • Transportation needs - non-medical: Not on file   Occupational History   • Not on file   Tobacco Use   • Smoking status: Never Smoker   • Smokeless tobacco: Never Used   Substance and Sexual Activity   • Alcohol use: Yes     Comment: rare    • Drug use: No   • Sexual activity: Defer   Other Topics Concern   • Not on file   Social History Narrative   • Not on file       Family History   Problem Relation Age of Onset   • Hypertension Other    • Diabetes Other    • Stomach cancer Other    • Prostate cancer Other    • Colon polyps Mother    • Green's esophagus Father    • Colon polyps Maternal Aunt    • Colon cancer Maternal Uncle    • Colon cancer Maternal Grandmother    • Colon polyps Maternal Grandmother    • Colon cancer Cousin    • Stomach cancer Paternal Grandfather    • No Known Problems Sister    • No Known Problems Brother    • No Known Problems Paternal Aunt    • No Known Problems Paternal Uncle    • No Known Problems Maternal Grandfather    • No Known Problems Paternal Grandmother    • Celiac disease Neg Hx    • Cirrhosis Neg Hx    • Crohn's disease Neg Hx    • Cystic fibrosis Neg Hx    • Esophageal cancer Neg Hx    • Hemochromatosis Neg Hx    • Inflammatory bowel disease Neg Hx    • Irritable bowel syndrome Neg Hx    • Liver cancer Neg Hx    • Liver disease Neg Hx    • Rectal cancer Neg Hx    • Ulcerative colitis Neg Hx    • Herber's disease Neg Hx    • Alcohol abuse Neg Hx    • Pancreatitis Neg Hx    • Anesthesia problems Neg Hx    • Broken bones Neg Hx    • Cancer Neg Hx    • Clotting disorder Neg Hx    • Collagen disease Neg Hx    • Dislocations Neg Hx    • Osteoporosis Neg Hx    • Rheumatologic disease Neg Hx     • Scoliosis Neg Hx    • Severe sprains Neg Hx        Review of Systems   Constitutional: Negative for appetite change, chills, diaphoresis, fatigue, fever and unexpected weight change.   HENT: Negative for nosebleeds, postnasal drip, sore throat, trouble swallowing and voice change.    Respiratory: Negative for cough, choking, chest tightness, shortness of breath and wheezing.    Cardiovascular: Negative for chest pain.   Gastrointestinal: Negative for abdominal distention, abdominal pain, anal bleeding, blood in stool, constipation, diarrhea, nausea, rectal pain and vomiting.   Endocrine: Negative for polydipsia, polyphagia and polyuria.   Musculoskeletal: Negative for gait problem.   Skin: Negative for rash and wound.   Allergic/Immunologic: Negative for food allergies.   Neurological: Negative for dizziness, speech difficulty and light-headedness.   Psychiatric/Behavioral: Negative for confusion, self-injury, sleep disturbance and suicidal ideas.       Vitals:    02/20/19 1536   BP: 118/70   Temp: 98.6 °F (37 °C)       Physical Exam   Constitutional: She is oriented to person, place, and time. She appears well-developed and well-nourished. She does not appear ill. No distress.   HENT:   Head: Normocephalic.   Eyes: Pupils are equal, round, and reactive to light.   Cardiovascular: Normal rate, regular rhythm and normal heart sounds.   Pulmonary/Chest: Effort normal and breath sounds normal.   Abdominal: Soft. Bowel sounds are normal. She exhibits no distension and no mass. There is no hepatosplenomegaly. There is no tenderness. There is no rebound and no guarding. No hernia.   Musculoskeletal: Normal range of motion.   Neurological: She is alert and oriented to person, place, and time.   Skin: Skin is warm and dry.   Psychiatric: She has a normal mood and affect. Her speech is normal and behavior is normal. Judgment normal.       No images are attached to the encounter.    Assessment & Plan    1.  Gastroesophageal reflux disease without esophagitis    2. Diarrhea, unspecified type    3. Rectal itching    I reviewed the results of her scopes with her. She seems to be doing well. GERD is much improved on PPI BID. Diarrhea has resolved. Rectal bleeding has resolved. Call office with any issues. Follow up with me or Dr. Duarte in 1 year.

## 2019-03-26 RX ORDER — PANTOPRAZOLE SODIUM 40 MG/1
40 TABLET, DELAYED RELEASE ORAL
Qty: 60 TABLET | Refills: 3 | OUTPATIENT
Start: 2019-03-26

## 2019-04-18 ENCOUNTER — OFFICE VISIT (OUTPATIENT)
Dept: ORTHOPEDIC SURGERY | Facility: CLINIC | Age: 34
End: 2019-04-18

## 2019-04-18 VITALS — BODY MASS INDEX: 33.34 KG/M2 | TEMPERATURE: 97.7 F | HEIGHT: 68 IN | WEIGHT: 220 LBS

## 2019-04-18 DIAGNOSIS — M25.562 CHRONIC PAIN OF BOTH KNEES: Primary | ICD-10-CM

## 2019-04-18 DIAGNOSIS — M25.561 CHRONIC PAIN OF BOTH KNEES: Primary | ICD-10-CM

## 2019-04-18 DIAGNOSIS — G89.29 CHRONIC PAIN OF BOTH KNEES: Primary | ICD-10-CM

## 2019-04-18 PROCEDURE — 99213 OFFICE O/P EST LOW 20 MIN: CPT | Performed by: ORTHOPAEDIC SURGERY

## 2019-04-18 PROCEDURE — 20610 DRAIN/INJ JOINT/BURSA W/O US: CPT | Performed by: ORTHOPAEDIC SURGERY

## 2019-04-18 RX ADMIN — LIDOCAINE HYDROCHLORIDE 4 ML: 10 INJECTION, SOLUTION EPIDURAL; INFILTRATION; INTRACAUDAL; PERINEURAL at 16:33

## 2019-04-18 RX ADMIN — METHYLPREDNISOLONE ACETATE 80 MG: 80 INJECTION, SUSPENSION INTRA-ARTICULAR; INTRALESIONAL; INTRAMUSCULAR; SOFT TISSUE at 16:33

## 2019-04-18 NOTE — PROGRESS NOTES
New Right Knee  And Left follow up knee     Patient: Jenna Jones        YOB: 1985    Medical Record Number: [id number]        Chief Complaints: bilateral knee pain      History of Present Illness: This is a this patient is here for follow-up of left knee pain also complaining of right knee pain left is worse than right been ongoing for several months no history injury change in activity last I saw her without was more patellofemoral she responded to conservative management current symptoms again moderate intermittent aching worse with activity somewhat better with rest past medical history is more for asthma anxiety constipation depression GERD        Allergies:   Allergies   Allergen Reactions   • Adhesive Tape        Medications:   Home Medications:  Current Outpatient Medications on File Prior to Visit   Medication Sig   • cetirizine (zyrTEC) 10 MG tablet Take 10 mg by mouth As Needed.   • Cholecalciferol (VITAMIN D3) 5000 units capsule capsule Take 5,000 Units by mouth Daily.   • Cyanocobalamin (VITAMIN B-12 PO) Take  by mouth.   • ferrous sulfate 325 (65 FE) MG tablet Take 325 mg by mouth Daily With Breakfast.   • hydrocortisone (ANUSOL-HC) 2.5 % rectal cream Insert  into the rectum 4 (Four) Times a Day As Needed for Hemorrhoids (rectal discomfort). (Patient taking differently: Insert  into the rectum As Needed for Hemorrhoids (rectal discomfort).)   • montelukast (SINGULAIR) 10 MG tablet    • pantoprazole (PROTONIX) 40 MG EC tablet Take 1 tablet by mouth 2 (Two) Times a Day Before Meals.   • PROAIR  (90 BASE) MCG/ACT inhaler INHALE TWO PUFFS BY MOUTH EVERY SIX HOURS AS NEEDED     No current facility-administered medications on file prior to visit.      Current Medications:  Scheduled Meds:  Continuous Infusions:  No current facility-administered medications for this visit.   PRN Meds:.    Past Medical History:   Diagnosis Date   • Anemia    • Anxiety    • Asthma    • Chronic  constipation    • Depression    • GERD (gastroesophageal reflux disease)    • H/O CT scan of abdomen 07/05/2016    unremaarkable-mild constipation        Past Surgical History:   Procedure Laterality Date   • CERVICAL CONIZATION     • COLONOSCOPY N/A 7/25/2018    A few erosions at 45 cm proximal to the anus   • ENDOSCOPY N/A 7/25/2018    Small hiatal hernia, gastritis   • KNEE SURGERY     • LIPOMA EXCISION      ankle   • ROTATOR CUFF REPAIR          Social History     Occupational History   • Not on file   Tobacco Use   • Smoking status: Never Smoker   • Smokeless tobacco: Never Used   Substance and Sexual Activity   • Alcohol use: Yes     Comment: rare    • Drug use: No   • Sexual activity: Defer      Social History     Social History Narrative   • Not on file        Family History   Problem Relation Age of Onset   • Hypertension Other    • Diabetes Other    • Stomach cancer Other    • Prostate cancer Other    • Colon polyps Mother    • Green's esophagus Father    • Colon polyps Maternal Aunt    • Colon cancer Maternal Uncle    • Colon cancer Maternal Grandmother    • Colon polyps Maternal Grandmother    • Colon cancer Cousin    • Stomach cancer Paternal Grandfather    • No Known Problems Sister    • No Known Problems Brother    • No Known Problems Paternal Aunt    • No Known Problems Paternal Uncle    • No Known Problems Maternal Grandfather    • No Known Problems Paternal Grandmother    • Celiac disease Neg Hx    • Cirrhosis Neg Hx    • Crohn's disease Neg Hx    • Cystic fibrosis Neg Hx    • Esophageal cancer Neg Hx    • Hemochromatosis Neg Hx    • Inflammatory bowel disease Neg Hx    • Irritable bowel syndrome Neg Hx    • Liver cancer Neg Hx    • Liver disease Neg Hx    • Rectal cancer Neg Hx    • Ulcerative colitis Neg Hx    • Herber's disease Neg Hx    • Alcohol abuse Neg Hx    • Pancreatitis Neg Hx    • Anesthesia problems Neg Hx    • Broken bones Neg Hx    • Cancer Neg Hx    • Clotting disorder Neg Hx    •  "Collagen disease Neg Hx    • Dislocations Neg Hx    • Osteoporosis Neg Hx    • Rheumatologic disease Neg Hx    • Scoliosis Neg Hx    • Severe sprains Neg Hx              Review of Systems: 14 point review of systems are remarkable for the knee pain only the remainder negative    Review of Systems      Physical Exam: 34 y.o. female  General Appearance:    Alert, cooperative, in no acute distress                   Vitals:    04/18/19 1634   Temp: 97.7 °F (36.5 °C)   TempSrc: Temporal   Weight: 99.8 kg (220 lb)   Height: 172.7 cm (68\")      Patient is alert and read ×3 no acute distress appears her above-listed at height weight and age.  Affect is normal respiratory rate is normal unlabored. Heart rate regular rate rhythm, sclera, dentition and hearing are normal for the purpose of this exam.        Ortho Exam physical exam the left knee she has mild effusion no overlying skin changes no lymphedema no lymphadenopathy.  She is sitting in a genu valgum position.  She has -5° of extension flexion is to 125 she has palpable tenderness laterally more than medially and crepitus with range of motion.  Her calf is soft and nontender  Physical exam of the right knee reveals no effusion, no erythema.  The patient has no palpable tenderness along the medial joint line, no tenderness about the lateral joint line.  Patient does have crepitus with patellofemoral range of motion.  They also have subjective symptoms anteriorly during exam.  The patient has a negative bounce home, negative Bunny and a stable ligamentous exam.  Quad tone is reasonable and symmetric.  There are no overlying skin changes no lymphedema no lymphadenopathy.  There is good hip range of motion which is full symmetric and asymptomatic and a normal ankle exam.  Hamstrings and IT band are tight bilaterally.      Large Joint Arthrocentesis: R knee  Date/Time: 4/18/2019 4:33 PM  Consent given by: patient  Site marked: site marked  Timeout: Immediately prior to " procedure a time out was called to verify the correct patient, procedure, equipment, support staff and site/side marked as required   Supporting Documentation  Indications: pain   Procedure Details  Location: knee - R knee  Needle size: 25 G  Approach: anteromedial  Medications administered: 80 mg methylPREDNISolone acetate 80 MG/ML; 4 mL lidocaine PF 1% 1 %  Patient tolerance: patient tolerated the procedure well with no immediate complications    Large Joint Arthrocentesis: L knee  Date/Time: 4/18/2019 4:33 PM  Consent given by: patient  Site marked: site marked  Timeout: Immediately prior to procedure a time out was called to verify the correct patient, procedure, equipment, support staff and site/side marked as required   Supporting Documentation  Indications: pain   Procedure Details  Location: knee - L knee  Needle size: 25 G  Approach: anteromedial  Medications administered: 80 mg methylPREDNISolone acetate 80 MG/ML; 4 mL lidocaine PF 1% 1 %  Patient tolerance: patient tolerated the procedure well with no immediate complications                   Radiology:   AP, Lateral and merchant views of the right and left knee  were ordered/reviewed to evauateknee pain.  I have compared to previous films she does have some lateral compartment narrowing on the left which has worsened some mild patellofemoral bilaterally no acute pathology is seen    Assessment/Plan:    Bilateral knee pain I think the left is lateral compartment OA of the right seems to be the more patellofemoral think she benefit from injection and then work on strengthening quad and core she fails to improve we will pursue other means of testing

## 2019-04-19 RX ORDER — METHYLPREDNISOLONE ACETATE 80 MG/ML
80 INJECTION, SUSPENSION INTRA-ARTICULAR; INTRALESIONAL; INTRAMUSCULAR; SOFT TISSUE
Status: COMPLETED | OUTPATIENT
Start: 2019-04-18 | End: 2019-04-18

## 2019-04-19 RX ORDER — LIDOCAINE HYDROCHLORIDE 10 MG/ML
4 INJECTION, SOLUTION EPIDURAL; INFILTRATION; INTRACAUDAL; PERINEURAL
Status: COMPLETED | OUTPATIENT
Start: 2019-04-18 | End: 2019-04-18

## 2019-07-23 ENCOUNTER — OFFICE VISIT (OUTPATIENT)
Dept: ORTHOPEDIC SURGERY | Facility: CLINIC | Age: 34
End: 2019-07-23

## 2019-07-23 VITALS — WEIGHT: 216 LBS | BODY MASS INDEX: 31.99 KG/M2 | HEIGHT: 69 IN | TEMPERATURE: 97 F

## 2019-07-23 DIAGNOSIS — M25.561 PAIN IN BOTH KNEES, UNSPECIFIED CHRONICITY: Primary | ICD-10-CM

## 2019-07-23 DIAGNOSIS — M25.562 PAIN IN BOTH KNEES, UNSPECIFIED CHRONICITY: Primary | ICD-10-CM

## 2019-07-23 DIAGNOSIS — M25.869 PATELLOFEMORAL DYSFUNCTION, UNSPECIFIED LATERALITY: ICD-10-CM

## 2019-07-23 PROCEDURE — 20610 DRAIN/INJ JOINT/BURSA W/O US: CPT | Performed by: ORTHOPAEDIC SURGERY

## 2019-07-23 RX ADMIN — METHYLPREDNISOLONE ACETATE 80 MG: 80 INJECTION, SUSPENSION INTRA-ARTICULAR; INTRALESIONAL; INTRAMUSCULAR; SOFT TISSUE at 15:39

## 2019-07-23 NOTE — PROGRESS NOTES
Patient: Jenna Jones  YOB: 1985  Date of Service: 7/23/2019    Chief Complaints:   Chief Complaint   Patient presents with   • Right Knee - Follow-up, Pain   • Left Knee - Follow-up, Pain   Bilateral knee pain    Subjective:    History of Present Illness: Pt is seen in the office today with complaints of Chief Complaint   Patient presents with   • Right Knee - Follow-up, Pain   • Left Knee - Follow-up, Pain   .    Bilateral knee pain with some degenerative changes we did inject her in March she is starting to do a little more exercise and her knees are irritated      Allergies:   Allergies   Allergen Reactions   • Adhesive Tape        Medications:   Home Medications:  Current Outpatient Medications on File Prior to Visit   Medication Sig   • cetirizine (zyrTEC) 10 MG tablet Take 10 mg by mouth As Needed.   • Cholecalciferol (VITAMIN D3) 5000 units capsule capsule Take 5,000 Units by mouth Daily.   • Cyanocobalamin (VITAMIN B-12 PO) Take  by mouth.   • ferrous sulfate 325 (65 FE) MG tablet Take 325 mg by mouth Daily With Breakfast.   • hydrocortisone (ANUSOL-HC) 2.5 % rectal cream Insert  into the rectum 4 (Four) Times a Day As Needed for Hemorrhoids (rectal discomfort). (Patient taking differently: Insert  into the rectum As Needed for Hemorrhoids (rectal discomfort).)   • montelukast (SINGULAIR) 10 MG tablet    • pantoprazole (PROTONIX) 40 MG EC tablet Take 1 tablet by mouth 2 (Two) Times a Day Before Meals.   • PROAIR  (90 BASE) MCG/ACT inhaler INHALE TWO PUFFS BY MOUTH EVERY SIX HOURS AS NEEDED     No current facility-administered medications on file prior to visit.      Current Medications:  Scheduled Meds:  Continuous Infusions:  No current facility-administered medications for this visit.   PRN Meds:.    I have reviewed the patient's medical history in detail and updated the computerized patient record.  Review and summarization of old records include:    Past Medical History:    Diagnosis Date   • Anemia    • Anxiety    • Asthma    • Chronic constipation    • Depression    • GERD (gastroesophageal reflux disease)    • H/O CT scan of abdomen 07/05/2016    unremaarkable-mild constipation        Past Surgical History:   Procedure Laterality Date   • CERVICAL CONIZATION     • COLONOSCOPY N/A 7/25/2018    A few erosions at 45 cm proximal to the anus   • ENDOSCOPY N/A 7/25/2018    Small hiatal hernia, gastritis   • KNEE SURGERY     • LIPOMA EXCISION      ankle   • ROTATOR CUFF REPAIR          Social History     Occupational History   • Not on file   Tobacco Use   • Smoking status: Never Smoker   • Smokeless tobacco: Never Used   Substance and Sexual Activity   • Alcohol use: Yes     Comment: rare    • Drug use: No   • Sexual activity: Defer    Social History     Social History Narrative   • Not on file        Family History   Problem Relation Age of Onset   • Hypertension Other    • Diabetes Other    • Stomach cancer Other    • Prostate cancer Other    • Colon polyps Mother    • Green's esophagus Father    • Colon polyps Maternal Aunt    • Colon cancer Maternal Uncle    • Colon cancer Maternal Grandmother    • Colon polyps Maternal Grandmother    • Colon cancer Cousin    • Stomach cancer Paternal Grandfather    • No Known Problems Sister    • No Known Problems Brother    • No Known Problems Paternal Aunt    • No Known Problems Paternal Uncle    • No Known Problems Maternal Grandfather    • No Known Problems Paternal Grandmother    • Celiac disease Neg Hx    • Cirrhosis Neg Hx    • Crohn's disease Neg Hx    • Cystic fibrosis Neg Hx    • Esophageal cancer Neg Hx    • Hemochromatosis Neg Hx    • Inflammatory bowel disease Neg Hx    • Irritable bowel syndrome Neg Hx    • Liver cancer Neg Hx    • Liver disease Neg Hx    • Rectal cancer Neg Hx    • Ulcerative colitis Neg Hx    • Herber's disease Neg Hx    • Alcohol abuse Neg Hx    • Pancreatitis Neg Hx    • Anesthesia problems Neg Hx    • Broken  bones Neg Hx    • Cancer Neg Hx    • Clotting disorder Neg Hx    • Collagen disease Neg Hx    • Dislocations Neg Hx    • Osteoporosis Neg Hx    • Rheumatologic disease Neg Hx    • Scoliosis Neg Hx    • Severe sprains Neg Hx        ROS: 14 point review of systems was performed and was negative except for documented findings in HPI and today's encounter.     Allergies:   Allergies   Allergen Reactions   • Adhesive Tape      Constitutional:  Denies fever, shaking or chills   Eyes:  Denies change in visual acuity   HENT:  Denies nasal congestion or sore throat   Respiratory:  Denies cough or shortness of breath   Cardiovascular:  Denies chest pain or severe LE edema   GI:  Denies abdominal pain, nausea, vomiting, bloody stools or diarrhea   Musculoskeletal:  Numbness, tingling, or loss of motor function only as noted above in history of present illness.  : Denies painful urination or hematuria  Integument:  Denies rash, lesion or ulceration   Neurologic:  Denies headache or focal weakness  Endocrine:  Denies lymphadenopathy  Psych:  Denies confusion or change in mental status   Hem:  Denies active bleeding      Physical Exam: 34 y.o. female  Wt Readings from Last 3 Encounters:   07/23/19 98 kg (216 lb)   04/18/19 99.8 kg (220 lb)   02/20/19 101 kg (222 lb 3.2 oz)       Body mass index is 31.9 kg/m².  Facility age limit for growth percentiles is 20 years.  Vitals:    07/23/19 1537   Temp: 97 °F (36.1 °C)     Vital signs reviewed.   General Appearance:    Alert, cooperative, in no acute distress                  Eyes: conjunctiva clear  ENT: external ears and nose atraumatic  CV: no peripheral edema  Resp: normal respiratory effort  Skin: no rashes or wounds; normal turgor  Psych: mood and affect appropriate  Lymph: no nodes appreciated  Neuro: gross sensation intact  Vascular:  Palpable peripheral pulse in noted extremity    Ortho exam      Physical exam of the left and right knee reveals no effusion, no erythema.  The  patient has no palpable tenderness along the medial joint line, no tenderness about the lateral joint line.  Patient does have crepitus with patellofemoral range of motion.  They also have subjective symptoms anteriorly during exam.  The patient has a negative bounce home, negative Bunny and a stable ligamentous exam.  Quad tone is reasonable and symmetric.  There are no overlying skin changes no lymphedema no lymphadenopathy.  There is good hip range of motion which is full symmetric and asymptomatic and a normal ankle exam.  Hamstrings and IT band are tight bilaterally.             Assessment: Bilateral knee pain I think this is mostly patellofemoral but also some degenerative changes talked about the importance of weight loss which she has done some is lost weight recently strengthening but strengthening and a reasonable fashion.  At her age this is not somebody I would want to inject 3 or 4 times a year.  I told her we need to spread these out    Plan:   Follow up as indicated.  Ice, elevate, and rest as needed.  Discussed conservative measures of pain control including ice, bracing.  Also talked about the importance of strengthening and maintaining ideal body weight    Janis Stern M.D.      Large Joint Arthrocentesis: R knee  Date/Time: 7/23/2019 3:39 PM  Consent given by: patient  Site marked: site marked  Timeout: Immediately prior to procedure a time out was called to verify the correct patient, procedure, equipment, support staff and site/side marked as required   Supporting Documentation  Indications: pain   Procedure Details  Location: knee - R knee  Preparation: Patient was prepped and draped in the usual sterile fashion  Needle size: 22 G  Approach: anteromedial  Medications administered: 80 mg methylPREDNISolone acetate 80 MG/ML; 4 mL lidocaine (cardiac)      Large Joint Arthrocentesis: L knee  Date/Time: 7/23/2019 3:39 PM  Consent given by: patient  Site marked: site marked  Timeout: Immediately  prior to procedure a time out was called to verify the correct patient, procedure, equipment, support staff and site/side marked as required   Supporting Documentation  Indications: pain   Procedure Details  Location: knee - L knee  Preparation: Patient was prepped and draped in the usual sterile fashion  Needle size: 22 G  Approach: anteromedial  Medications administered: 80 mg methylPREDNISolone acetate 80 MG/ML; 4 mL lidocaine (cardiac)  Patient tolerance: patient tolerated the procedure well with no immediate complications

## 2019-07-24 RX ORDER — METHYLPREDNISOLONE ACETATE 80 MG/ML
80 INJECTION, SUSPENSION INTRA-ARTICULAR; INTRALESIONAL; INTRAMUSCULAR; SOFT TISSUE
Status: COMPLETED | OUTPATIENT
Start: 2019-07-23 | End: 2019-07-23

## 2019-08-22 ENCOUNTER — TELEPHONE (OUTPATIENT)
Dept: GASTROENTEROLOGY | Facility: CLINIC | Age: 34
End: 2019-08-22

## 2019-08-22 ENCOUNTER — OFFICE VISIT (OUTPATIENT)
Dept: ORTHOPEDIC SURGERY | Facility: CLINIC | Age: 34
End: 2019-08-22

## 2019-08-22 VITALS — HEIGHT: 68 IN | WEIGHT: 214.2 LBS | BODY MASS INDEX: 32.46 KG/M2 | TEMPERATURE: 97.8 F

## 2019-08-22 DIAGNOSIS — M25.561 MECHANICAL KNEE PAIN, RIGHT: ICD-10-CM

## 2019-08-22 DIAGNOSIS — M25.562 PAIN IN BOTH KNEES, UNSPECIFIED CHRONICITY: Primary | ICD-10-CM

## 2019-08-22 DIAGNOSIS — M25.561 PAIN IN BOTH KNEES, UNSPECIFIED CHRONICITY: Primary | ICD-10-CM

## 2019-08-22 DIAGNOSIS — M25.561 ACUTE PAIN OF RIGHT KNEE: ICD-10-CM

## 2019-08-22 PROCEDURE — 73562 X-RAY EXAM OF KNEE 3: CPT | Performed by: NURSE PRACTITIONER

## 2019-08-22 PROCEDURE — 99214 OFFICE O/P EST MOD 30 MIN: CPT | Performed by: NURSE PRACTITIONER

## 2019-08-22 NOTE — PROGRESS NOTES
Patient Name: Jenna Jones   YOB: 1985  Referring Primary Care Physician: Viviane Morrow MD  BMI: Body mass index is 32.57 kg/m².    Chief Complaint:    Chief Complaint   Patient presents with   • Right Knee - Establish Care        HPI: Pt has been in a new workout program for 2 weeks and bent down and felt a pop to medial aspect of right knee.. Pt is concerned bc she has had meniscus tears in the past. 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 Pt follows with [facility]. Pt has IBS and hx of rectal bleeding and can not take NSAIDS.  MENSES CURRENT  Jenna Jones is a 34 y.o. female who presents today for evaluation of   Chief Complaint   Patient presents with   • Right Knee - Establish Care       This problem is new to this examiner.     Subjective   Medications:   Home Medications:  Current Outpatient Medications on File Prior to Visit   Medication Sig   • cetirizine (zyrTEC) 10 MG tablet Take 10 mg by mouth As Needed.   • Cholecalciferol (VITAMIN D3) 5000 units capsule capsule Take 5,000 Units by mouth Daily.   • Cyanocobalamin (VITAMIN B-12 PO) Take  by mouth.   • ferrous sulfate 325 (65 FE) MG tablet Take 325 mg by mouth Daily With Breakfast.   • hydrocortisone (ANUSOL-HC) 2.5 % rectal cream Insert  into the rectum 4 (Four) Times a Day As Needed for Hemorrhoids (rectal discomfort). (Patient taking differently: Insert  into the rectum As Needed for Hemorrhoids (rectal discomfort).)   • montelukast (SINGULAIR) 10 MG tablet    • pantoprazole (PROTONIX) 40 MG EC tablet Take 1 tablet by mouth 2 (Two) Times a Day Before Meals.   • PROAIR  (90 BASE) MCG/ACT inhaler INHALE TWO PUFFS BY MOUTH EVERY SIX HOURS AS NEEDED     No current facility-administered medications on file prior to visit.      Current Medications:  Scheduled Meds:  Continuous Infusions:  No current facility-administered medications for this visit.   PRN Meds:.    I have reviewed the patient's medical history in detail and updated  the computerized patient record.  Review and summarization of old records includes:    Past Medical History:   Diagnosis Date   • Anemia    • Anxiety    • Asthma    • Chronic constipation    • Depression    • GERD (gastroesophageal reflux disease)    • H/O CT scan of abdomen 07/05/2016    unremaarkable-mild constipation        Past Surgical History:   Procedure Laterality Date   • CERVICAL CONIZATION     • COLONOSCOPY N/A 7/25/2018    A few erosions at 45 cm proximal to the anus   • ENDOSCOPY N/A 7/25/2018    Small hiatal hernia, gastritis   • KNEE SURGERY     • LIPOMA EXCISION      ankle   • ROTATOR CUFF REPAIR          Social History     Occupational History   • Not on file   Tobacco Use   • Smoking status: Never Smoker   • Smokeless tobacco: Never Used   Substance and Sexual Activity   • Alcohol use: Yes     Comment: rare    • Drug use: No   • Sexual activity: Defer      Social History     Social History Narrative   • Not on file        Family History   Problem Relation Age of Onset   • Hypertension Other    • Diabetes Other    • Stomach cancer Other    • Prostate cancer Other    • Colon polyps Mother    • Green's esophagus Father    • Colon polyps Maternal Aunt    • Colon cancer Maternal Uncle    • Colon cancer Maternal Grandmother    • Colon polyps Maternal Grandmother    • Colon cancer Cousin    • Stomach cancer Paternal Grandfather    • No Known Problems Sister    • No Known Problems Brother    • No Known Problems Paternal Aunt    • No Known Problems Paternal Uncle    • No Known Problems Maternal Grandfather    • No Known Problems Paternal Grandmother    • Celiac disease Neg Hx    • Cirrhosis Neg Hx    • Crohn's disease Neg Hx    • Cystic fibrosis Neg Hx    • Esophageal cancer Neg Hx    • Hemochromatosis Neg Hx    • Inflammatory bowel disease Neg Hx    • Irritable bowel syndrome Neg Hx    • Liver cancer Neg Hx    • Liver disease Neg Hx    • Rectal cancer Neg Hx    • Ulcerative colitis Neg Hx    • Herber's  "disease Neg Hx    • Alcohol abuse Neg Hx    • Pancreatitis Neg Hx    • Anesthesia problems Neg Hx    • Broken bones Neg Hx    • Cancer Neg Hx    • Clotting disorder Neg Hx    • Collagen disease Neg Hx    • Dislocations Neg Hx    • Osteoporosis Neg Hx    • Rheumatologic disease Neg Hx    • Scoliosis Neg Hx    • Severe sprains Neg Hx        ROS: 14 point review of systems was performed and all other systems were reviewed and are negative except for documented findings in HPI and today's encounter.     Allergies:   Allergies   Allergen Reactions   • Adhesive Tape      Constitutional:  Denies fever, shaking or chills   Eyes:  Denies change in visual acuity   HENT:  Denies nasal congestion or sore throat   Respiratory:  Denies cough or shortness of breath   Cardiovascular:  Denies chest pain or severe LE edema   GI:  Denies abdominal pain, nausea, vomiting, bloody stools or diarrhea   Musculoskeletal:  Numbness, tingling, pain, or loss of motor function only as noted above in history of present illness.  : Denies painful urination or hematuria  Integument:  Denies rash, lesion or ulceration   Neurologic:  Denies headache or focal weakness  Endocrine:  Denies lymphadenopathy  Psych:  Denies confusion or change in mental status   Hem:  Denies active bleeding    OBJECTIVE:  Physical Exam:   Temp 97.8 °F (36.6 °C) (Temporal)   Ht 172.7 cm (68\")   Wt 97.2 kg (214 lb 3.2 oz)   BMI 32.57 kg/m²     General Appearance:    Alert, cooperative, in no acute distress                  Eyes: conjunctiva clear  ENT: external ears and nose atraumatic  CV: no peripheral edema  Resp: normal respiratory effort  Skin: no rashes or wounds; normal turgor  Psych: mood and affect appropriate  Lymph: no nodes appreciated  Neuro: gross sensation intact  Vascular:  Palpable peripheral pulse in noted extremity  Musculoskeletal Extremities: RIGHT KNEE WITH EFFUSION AND CHILO'S POSITIVE TO RIGHT AND JOINT LINE TENDERNESS      Radiology: 3 VIEWS " OF RIGHT KNEE DONE FOR ACUTE PAIN WITH COMPARISONS FROM 2018 THAT SHOW SOME ARTHRITIC CHANGES -NO FRACTURE    Assessment:     ICD-10-CM ICD-9-CM   1. Pain in both knees, unspecified chronicity M25.561 719.46    M25.562    2. Acute pain of right knee M25.561 719.46   3. Mechanical knee pain, right M25.561 719.46        Procedures       Plan: MEDICATIONS:  Prescription, OTC and Monitoring of Medications per orders to address ortho complaints; Evaluation and discussion of safety, precautions, side effects, and warnings given especially of long term NSAID or steroid therapy.    RICE: Rest, ice, compression, and elevation therapy, Cryotherapy/brachy therapy, and or OTC linaments as indicated with instructions.   MRI.      8/22/2019    Much of this encounter note is an electronic transcription/translation of spoken language to printed text. The electronic translation of spoken language may permit erroneous, or at times, nonsensical words or phrases to be inadvertently transcribed; Although I have reviewed the note for such errors, some may still exist

## 2019-08-22 NOTE — TELEPHONE ENCOUNTER
----- Message from Nelia Roberson sent at 8/22/2019  8:32 AM EDT -----  Regarding: Refill  Contact: [phone]  Pt is calling for pantoprazole (PROTONIX) 40 MG refill

## 2019-08-22 NOTE — TELEPHONE ENCOUNTER
Called pt and advised that her pantoprazole was refilled in Feb 2019 with a years worth of refills at her [facility] pharmacy.  [facility] states [facility] is telling [facility] that they do not have the script .  Advised pt we will call her [facility] and will get back with her.    Called pt's [facility] pharmacy and spoke with pharmacist Kd who states they do have the pt's pantoprazole script on file and they will fill it.     Called pt and on vm advised of the above. Advised pt to call with any problems.

## 2019-08-22 NOTE — PATIENT INSTRUCTIONS
Meniscus Tear    A meniscus tear is a knee injury in which a piece of the meniscus is torn. The meniscus is a thick, rubbery, wedge-shaped cartilage in the knee. Two menisci are located in each knee. They sit between the upper bone (femur) and lower bone (tibia) that make up the knee joint. Each meniscus acts as a shock absorber for the knee.  A torn meniscus is one of the most common types of knee injuries. This injury can range from mild to severe. Surgery may be needed for a severe tear.  What are the causes?  This injury may be caused by any squatting, twisting, or pivoting movement. Sports-related injuries are the most common cause. These often occur from:  · Running and stopping suddenly.  · Changing direction.  · Being tackled or knocked off your feet.  As people get older, their meniscus gets thinner and weaker. In these people, tears can happen more easily, such as from climbing stairs.  What increases the risk?  This injury is more likely to happen to:  · People who play contact sports.  · Males.  · People who are 30?40 years of age.  What are the signs or symptoms?  Symptoms of this injury include:  · Knee pain, especially at the side of the knee joint. You may feel pain when the injury occurs, or you may only hear a pop and feel pain later.  · A feeling that your knee is clicking, catching, locking, or giving way.  · Not being able to fully bend or extend your knee.  · Bruising or swelling in your knee.  How is this diagnosed?  This injury may be diagnosed based on your symptoms and a physical exam. The physical exam may include:  · Moving your knee in different ways.  · Feeling for tenderness.  · Listening for a clicking sound.  · Checking if your knee locks or catches.  You may also have tests, such as:  · X-rays.  · MRI.  · A procedure to look inside your knee with a narrow surgical telescope (arthroscopy).  You may be referred to a knee specialist (orthopedic surgeon).  How is this  treated?  Treatment for this injury depends on the severity of the tear. Treatment for a mild tear may include:  · Rest.  · Medicine to reduce pain and swelling. This is usually a nonsteroidal anti-inflammatory drug (NSAID).  · A knee brace or an elastic sleeve or wrap.  · Using crutches or a walker to keep weight off your knee and to help you walk.  · Exercises to strengthen your knee (physical therapy).  You may need surgery if you have a severe tear or if other treatments are not working.  Follow these instructions at home:  Managing pain and swelling  · Take over-the-counter and prescription medicines only as told by your health care provider.  · If directed, apply ice to the injured area:  ? Put ice in a plastic bag.  ? Place a towel between your skin and the bag.  ? Leave the ice on for 20 minutes, 2-3 times per day.  · Raise (elevate) the injured area above the level of your heart while you are sitting or lying down.  Activity  · Do not use the injured limb to support your body weight until your health care provider says that you can. Use crutches or a walker as told by your health care provider.  · Return to your normal activities as told by your health care provider. Ask your health care provider what activities are safe for you.  · Perform range-of-motion exercises only as told by your health care provider.  · Begin doing exercises to strengthen your knee and leg muscles only as told by your health care provider. After you recover, your health care provider may recommend these exercises to help prevent another injury.  General instructions  · Use a knee brace or elastic wrap as told by your health care provider.  · Keep all follow-up visits as told by your health care provider. This is important.  Contact a health care provider if:  · You have a fever.  · Your knee becomes red, tender, or swollen.  · Your pain medicine is not helping.  · Your symptoms get worse or do not improve after 2 weeks of home  care.  This information is not intended to replace advice given to you by your health care provider. Make sure you discuss any questions you have with your health care provider.  Document Released: 03/09/2004 Document Revised: 05/25/2017 Document Reviewed: 04/11/2016  [employer] Interactive Patient Education © 2019 [employer]

## 2019-09-07 ENCOUNTER — HOSPITAL ENCOUNTER (OUTPATIENT)
Dept: MRI IMAGING | Facility: HOSPITAL | Age: 34
Discharge: HOME | End: 2019-09-07
Admitting: NURSE PRACTITIONER

## 2019-09-07 DIAGNOSIS — M25.562 PAIN IN BOTH KNEES, UNSPECIFIED CHRONICITY: ICD-10-CM

## 2019-09-07 DIAGNOSIS — M25.561 MECHANICAL KNEE PAIN, RIGHT: ICD-10-CM

## 2019-09-07 DIAGNOSIS — M25.561 ACUTE PAIN OF RIGHT KNEE: ICD-10-CM

## 2019-09-07 DIAGNOSIS — M25.561 PAIN IN BOTH KNEES, UNSPECIFIED CHRONICITY: ICD-10-CM

## 2019-09-07 PROCEDURE — 73721 MRI JNT OF LWR EXTRE W/O DYE: CPT

## 2019-09-12 ENCOUNTER — OFFICE VISIT (OUTPATIENT)
Dept: ORTHOPEDIC SURGERY | Facility: CLINIC | Age: 34
End: 2019-09-12

## 2019-09-12 VITALS — WEIGHT: 210.4 LBS | BODY MASS INDEX: 31.89 KG/M2 | TEMPERATURE: 98.2 F | HEIGHT: 68 IN

## 2019-09-12 DIAGNOSIS — S83.281A OTHER TEAR OF LATERAL MENISCUS OF RIGHT KNEE AS CURRENT INJURY, INITIAL ENCOUNTER: Primary | ICD-10-CM

## 2019-09-12 PROCEDURE — 99214 OFFICE O/P EST MOD 30 MIN: CPT | Performed by: ORTHOPAEDIC SURGERY

## 2019-09-16 NOTE — PROGRESS NOTES
New Knee      Patient: Jenna Jones        YOB: 1985    Medical Record Number: [id number]        Chief Complaints:   Right knee pain    History of Present Illness: This is a 34-year-old female who presents complaining of right knee pain she states that happened about a week ago she was working out doing squats and bending when she noticed pain in her knee her pain is been progressive it is intermittent moderate grinding aching burning she does have swelling symptoms are worse with standing and walking somewhat better with rest her symptoms are primarily lateral 14 point review of systems are remarkable for the knee pain only the remainder negative her past medical history smart for anxiety anemia asthma constipation depression GERD        Allergies:   Allergies   Allergen Reactions   • Adhesive Tape        Medications:   Home Medications:  Current Outpatient Medications on File Prior to Visit   Medication Sig   • cetirizine (zyrTEC) 10 MG tablet Take 10 mg by mouth As Needed.   • Cholecalciferol (VITAMIN D3) 5000 units capsule capsule Take 5,000 Units by mouth Daily.   • Cyanocobalamin (VITAMIN B-12 PO) Take  by mouth.   • ferrous sulfate 325 (65 FE) MG tablet Take 325 mg by mouth Daily With Breakfast.   • hydrocortisone (ANUSOL-HC) 2.5 % rectal cream Insert  into the rectum 4 (Four) Times a Day As Needed for Hemorrhoids (rectal discomfort). (Patient taking differently: Insert  into the rectum As Needed for Hemorrhoids (rectal discomfort).)   • montelukast (SINGULAIR) 10 MG tablet    • pantoprazole (PROTONIX) 40 MG EC tablet Take 1 tablet by mouth 2 (Two) Times a Day Before Meals.   • PROAIR  (90 BASE) MCG/ACT inhaler INHALE TWO PUFFS BY MOUTH EVERY SIX HOURS AS NEEDED     No current facility-administered medications on file prior to visit.      Current Medications:  Scheduled Meds:  Continuous Infusions:  No current facility-administered medications for this visit.   PRN Meds:.    Past  Medical History:   Diagnosis Date   • Anemia    • Anxiety    • Asthma    • Chronic constipation    • Depression    • GERD (gastroesophageal reflux disease)    • H/O CT scan of abdomen 07/05/2016    unremaarkable-mild constipation        Past Surgical History:   Procedure Laterality Date   • CERVICAL CONIZATION     • COLONOSCOPY N/A 7/25/2018    A few erosions at 45 cm proximal to the anus   • ENDOSCOPY N/A 7/25/2018    Small hiatal hernia, gastritis   • KNEE SURGERY     • LIPOMA EXCISION      ankle   • ROTATOR CUFF REPAIR          Social History     Occupational History   • Not on file   Tobacco Use   • Smoking status: Never Smoker   • Smokeless tobacco: Never Used   Substance and Sexual Activity   • Alcohol use: Yes     Comment: rare    • Drug use: No   • Sexual activity: Defer    Social History     Social History Narrative   • Not on file        Family History   Problem Relation Age of Onset   • Hypertension Other    • Diabetes Other    • Stomach cancer Other    • Prostate cancer Other    • Colon polyps Mother    • Green's esophagus Father    • Colon polyps Maternal Aunt    • Colon cancer Maternal Uncle    • Colon cancer Maternal Grandmother    • Colon polyps Maternal Grandmother    • Colon cancer Cousin    • Stomach cancer Paternal Grandfather    • No Known Problems Sister    • No Known Problems Brother    • No Known Problems Paternal Aunt    • No Known Problems Paternal Uncle    • No Known Problems Maternal Grandfather    • No Known Problems Paternal Grandmother    • Celiac disease Neg Hx    • Cirrhosis Neg Hx    • Crohn's disease Neg Hx    • Cystic fibrosis Neg Hx    • Esophageal cancer Neg Hx    • Hemochromatosis Neg Hx    • Inflammatory bowel disease Neg Hx    • Irritable bowel syndrome Neg Hx    • Liver cancer Neg Hx    • Liver disease Neg Hx    • Rectal cancer Neg Hx    • Ulcerative colitis Neg Hx    • Herber's disease Neg Hx    • Alcohol abuse Neg Hx    • Pancreatitis Neg Hx    • Anesthesia problems Neg  "Hx    • Broken bones Neg Hx    • Cancer Neg Hx    • Clotting disorder Neg Hx    • Collagen disease Neg Hx    • Dislocations Neg Hx    • Osteoporosis Neg Hx    • Rheumatologic disease Neg Hx    • Scoliosis Neg Hx    • Severe sprains Neg Hx              Review of Systems: 14 point review of systems are remarkable for the knee pain only the remainder negative    Review of Systems      Physical Exam: 34 y.o. female  General Appearance:    Alert, cooperative, in no acute distress                 Vitals:    09/12/19 1618   Temp: 98.2 °F (36.8 °C)   TempSrc: Temporal   Weight: 95.4 kg (210 lb 6.4 oz)   Height: 172.7 cm (68\")      Patient is alert and read ×3 no acute distress appears her above-listed at height weight and age.  Affect is normal respiratory rate is normal unlabored. Heart rate regular rate rhythm, sclera, dentition and hearing are normal for the purpose of this exam.        Ortho Exam Physical exam of the right knee reveals no effusion no redness.  The patient does have tenderness about the lateral joint line.  No tenderness about the medial joint line.  A negative bounce home and a positive lateral Bunny.    Patient has a stable ligamentous exam.  The patient has a negative Lachman and negative anterior drawer and a negative pivot shift.  Quads are reasonable and symmetric bilaterally.  Calf is soft and nontender.  There is no overlying skin changes no lymphedema lymphadenopathy.  Patient has good hip range of motion full symmetric and asymptomatic and a normal ankle exam        Procedures             Radiology:   AP, Lateral and merchant views of the right knee  were these were ordered by Niya Hamm I did review these she does have some degenerative changes laterally with some squaring off of her lateral tibial plateau she also comes with an MRI which shows evidence of partial lateral meniscectomy with a recurrent lateral meniscus tear and some degenerative changes/reviewed to evauateknee pain. "   Imaging Results (most recent)     None        Assessment/Plan:      Right knee lateral meniscus tear had a long discussion with her regarding options she understands arthroscopy right now she is can hold off she will evaluate her symptoms and call me if she wishes to proceed with arthroscopy we did discuss in detail risk benefits and alternatives

## 2019-09-24 ENCOUNTER — OFFICE VISIT (OUTPATIENT)
Dept: ORTHOPEDIC SURGERY | Facility: CLINIC | Age: 34
End: 2019-09-24

## 2019-09-24 VITALS — TEMPERATURE: 97.7 F | HEIGHT: 67 IN | WEIGHT: 210.8 LBS | BODY MASS INDEX: 33.09 KG/M2

## 2019-09-24 DIAGNOSIS — S83.242D TEAR OF MEDIAL MENISCUS OF LEFT KNEE, CURRENT, UNSPECIFIED TEAR TYPE, SUBSEQUENT ENCOUNTER: Primary | ICD-10-CM

## 2019-09-24 PROCEDURE — 99213 OFFICE O/P EST LOW 20 MIN: CPT | Performed by: ORTHOPAEDIC SURGERY

## 2019-09-24 NOTE — PROGRESS NOTES
"Right Knee Follow Up      Patient: Jenna Jones        YOB: 1985            Chief Complaints: knee pain right and left knee pain  Chief Complaint   Patient presents with   • Right Knee - Follow-up, Pain           History of Present Illness: Patient is here for a further discussion about her MRI on the right knee and options she had a prior knee arthroscopy on the right knee her symptoms have returned the did seem mechanical MRI does show a medial meniscus tear as well, she has a defect in her medial femoral condyle.  We talked about arthroscopy at last visit her daughter was with her and she felt like she just could not focus and just had some more questions.  She is also complaining of left knee pain today that feels very similar to the right knee she would like that worked up as well no definitive injury she does have moderate intermediate symptoms she has had that knee scope before as well her past medical history medications are all unchanged      Physical Exam: 34 y.o. female  General Appearance:    Alert, cooperative, in no acute distress                   Vitals:    09/24/19 1345   Temp: 97.7 °F (36.5 °C)   Weight: 95.6 kg (210 lb 12.8 oz)   Height: 170.2 cm (67\")        Patient is alert and read ×3 no acute distress appears her above-listed at height weight and age.  Affect is normal respiratory rate is normal unlabored. Heart rate regular rate rhythm, sclera, dentition and hearing are normal for the purpose of this exam.      Ortho Exam     Physical exam of the right  knee reveals no effusion no redness.  The patient does have tenderness about the medial l joint line.  No tenderness about the lateral l joint line.  A negative bounce home and a positive l medial Bunny.    Patient has a stable ligamentous exam.  The patient has a negative Lachman and negative anterior drawer and a negative pivot shift.  Quads are reasonable and symmetric bilaterally.  Calf is soft and nontender.  " There is no overlying skin changes no lymphedema lymphadenopathy.  Patient has good hip range of motion full symmetric and asymptomatic and a normal ankle exam.  She has good distal pulses and sensation distally is intact    Physical exam of the left knee reveals no effusion no redness.  The patient does have tenderness about the medial joint line.  No tenderness about the lateral joint line.  A negative bounce home and a positive medial Bunny.  There is some pain medially  with a lateral Bunny.  Patient has a stable ligamentous exam.  Quads are reasonable and symmetric bilaterally.  Calf is soft and nontender.  There is no overlying skin changes no lymphedema lymphadenopathy.  Patient has good hip range of motion full symmetric and asymptomatic and a normal ankle exam.    Previous x-rays were which include an AP and merchant view left knee show good maintenance of her joint space no evidence of any acute bony pathology        Assessment/Plan:    Right knee pain with a known medial meniscus tear plan is to proceed with arthroscopy at her convenience.  She understands all risk benefits and alternatives The patient voiced understanding of the risks, benefits, and alternative forms of treatment that were discussed and the patient consents to proceed with the above listed surgery.  All risks, benefits and alternatives were discussed.  Risks including to but not exclusive to anesthetic complications, including death, MI, CVA, infection, bleeding DVT, fracture, residual pain and need for future surgery.  She also complaining of left knee pain plan is to proceed with work-up of that she has done stretching strengthening anti-inflammatories all with no lasting improvement

## 2019-10-05 ENCOUNTER — HOSPITAL ENCOUNTER (OUTPATIENT)
Dept: MRI IMAGING | Facility: HOSPITAL | Age: 34
Discharge: HOME | End: 2019-10-05
Admitting: ORTHOPAEDIC SURGERY

## 2019-10-05 DIAGNOSIS — S83.242D TEAR OF MEDIAL MENISCUS OF LEFT KNEE, CURRENT, UNSPECIFIED TEAR TYPE, SUBSEQUENT ENCOUNTER: ICD-10-CM

## 2019-10-05 PROCEDURE — 73721 MRI JNT OF LWR EXTRE W/O DYE: CPT

## 2019-10-10 ENCOUNTER — TELEPHONE (OUTPATIENT)
Dept: ORTHOPEDIC SURGERY | Facility: CLINIC | Age: 34
End: 2019-10-10

## 2019-10-14 DIAGNOSIS — S83.281D OTHER TEAR OF LATERAL MENISCUS OF RIGHT KNEE AS CURRENT INJURY, SUBSEQUENT ENCOUNTER: Primary | ICD-10-CM

## 2019-10-14 NOTE — TELEPHONE ENCOUNTER
Case request is in.  Please also tell her that her left knee MRI shows no new tear but she does have furthering of her arthritis

## 2019-10-21 ENCOUNTER — TELEPHONE (OUTPATIENT)
Dept: ORTHOPEDIC SURGERY | Facility: CLINIC | Age: 34
End: 2019-10-21

## 2019-10-21 PROBLEM — S83.281A TEAR OF LATERAL MENISCUS OF RIGHT KNEE, CURRENT: Status: ACTIVE | Noted: 2019-10-21

## 2019-10-25 ENCOUNTER — PREP FOR SURGERY (OUTPATIENT)
Dept: OTHER | Facility: HOSPITAL | Age: 34
End: 2019-10-25

## 2019-10-25 DIAGNOSIS — S83.281D TEAR OF LATERAL MENISCUS OF RIGHT KNEE, CURRENT, UNSPECIFIED TEAR TYPE, SUBSEQUENT ENCOUNTER: Primary | ICD-10-CM

## 2019-10-25 RX ORDER — CEFAZOLIN SODIUM 2 G/100ML
2 INJECTION, SOLUTION INTRAVENOUS ONCE
Status: CANCELLED | OUTPATIENT
Start: 2019-12-23 | End: 2019-10-25

## 2019-10-29 ENCOUNTER — APPOINTMENT (OUTPATIENT)
Dept: PREADMISSION TESTING | Facility: HOSPITAL | Age: 34
End: 2019-10-29

## 2019-12-16 ENCOUNTER — APPOINTMENT (OUTPATIENT)
Dept: PREADMISSION TESTING | Facility: HOSPITAL | Age: 34
End: 2019-12-16

## 2019-12-16 VITALS
BODY MASS INDEX: 31.37 KG/M2 | RESPIRATION RATE: 16 BRPM | DIASTOLIC BLOOD PRESSURE: 67 MMHG | OXYGEN SATURATION: 100 % | HEIGHT: 68 IN | WEIGHT: 207 LBS | TEMPERATURE: 98.1 F | HEART RATE: 65 BPM | SYSTOLIC BLOOD PRESSURE: 106 MMHG

## 2019-12-16 LAB
ANION GAP SERPL CALCULATED.3IONS-SCNC: 10.1 MMOL/L (ref 5–15)
BUN BLD-MCNC: 12 MG/DL (ref 6–20)
BUN/CREAT SERPL: 14.3 (ref 7–25)
CALCIUM SPEC-SCNC: 9 MG/DL (ref 8.6–10.5)
CHLORIDE SERPL-SCNC: 105 MMOL/L (ref 98–107)
CO2 SERPL-SCNC: 26.9 MMOL/L (ref 22–29)
CREAT BLD-MCNC: 0.84 MG/DL (ref 0.57–1)
DEPRECATED RDW RBC AUTO: 41.4 FL (ref 37–54)
ERYTHROCYTE [DISTWIDTH] IN BLOOD BY AUTOMATED COUNT: 15.4 % (ref 12.3–15.4)
GFR SERPL CREATININE-BSD FRML MDRD: 77 ML/MIN/1.73
GLUCOSE BLD-MCNC: 84 MG/DL (ref 65–99)
HCT VFR BLD AUTO: 34.9 % (ref 34–46.6)
HGB BLD-MCNC: 10.6 G/DL (ref 12–15.9)
MCH RBC QN AUTO: 22.6 PG (ref 26.6–33)
MCHC RBC AUTO-ENTMCNC: 30.4 G/DL (ref 31.5–35.7)
MCV RBC AUTO: 74.3 FL (ref 79–97)
PLATELET # BLD AUTO: 215 10*3/MM3 (ref 140–450)
PMV BLD AUTO: 10.6 FL (ref 6–12)
POTASSIUM BLD-SCNC: 4.4 MMOL/L (ref 3.5–5.2)
RBC # BLD AUTO: 4.7 10*6/MM3 (ref 3.77–5.28)
SODIUM BLD-SCNC: 142 MMOL/L (ref 136–145)
WBC NRBC COR # BLD: 4.85 10*3/MM3 (ref 3.4–10.8)

## 2019-12-16 PROCEDURE — 80048 BASIC METABOLIC PNL TOTAL CA: CPT | Performed by: ORTHOPAEDIC SURGERY

## 2019-12-16 PROCEDURE — 85027 COMPLETE CBC AUTOMATED: CPT | Performed by: ORTHOPAEDIC SURGERY

## 2019-12-16 PROCEDURE — 36415 COLL VENOUS BLD VENIPUNCTURE: CPT

## 2019-12-16 NOTE — DISCHARGE INSTRUCTIONS
Take the following medications the morning of surgery: PANTOPRAZOLE        General Instructions:  • Do not eat solid food after midnight the night before surgery.  • You may drink clear liquids day of surgery but must stop at least one hour before your hospital arrival time.  • It is beneficial for you to have a clear drink that contains carbohydrates the day of surgery.  We suggest a 12 to 20 ounce bottle of Gatorade or Powerade for non-diabetic patients or a 12 to 20 ounce bottle of G2 or Powerade Zero for diabetic patients.    Clear liquids are liquids you can see through.  Nothing red in color.     Plain water                               Sports drinks  Sodas                                   Gelatin (Jell-O)  Fruit juices without pulp such as white grape juice and apple juice  Popsicles that contain no fruit or yogurt  Tea or coffee (no cream or milk added)  Gatorade / Powerade  G2 / Powerade Zero    • Bring any papers given to you in the doctor’s office.  • Wear clean comfortable clothes.  • Do not wear contact lenses, false eyelashes or make-up.  Bring a case for your glasses.   • Bring crutches or walker if applicable.  • Remove all piercings.  Leave jewelry and any other valuables at home.  • The Pre-Admission Testing nurse will instruct you to bring medications if unable to obtain an accurate list in Pre-Admission Testing.            Preventing a Surgical Site Infection:  • For 2 to 3 days before surgery, avoid shaving with a razor because the razor can irritate skin and make it easier to develop an infection.    • Any areas of open skin can increase the risk of a post-operative wound infection by allowing bacteria to enter and travel throughout the body.  Notify your surgeon if you have any skin wounds / rashes even if it is not near the expected surgical site.  The area will need assessed to determine if surgery should be delayed until it is healed.  • The night prior to surgery sleep in a clean bed  with clean clothing.  Do not allow pets to sleep with you.  • Shower on the morning of surgery using a fresh bar of anti-bacterial soap (such as Dial) and clean washcloth.  Dry with a clean towel and dress in clean clothing.  • Ask your surgeon if you will be receiving antibiotics prior to surgery.  • Make sure you, your family, and all healthcare providers clean their hands with soap and water or an alcohol based hand  before caring for you or your wound.    Day of surgery: 12/23. [facility]. ARRIVAL TIME 545 AM  Your arrival time is approximately two hours before your scheduled surgery time.  Upon arrival, a Pre-op nurse and Anesthesiologist will review your health history, obtain vital signs, and answer questions you may have.  The only belongings needed at this time will be a list of your home medications and if applicable your C-PAP/BI-PAP machine.  If you are staying overnight your family can leave the rest of your belongings in the car and bring them to your room later.  A Pre-op nurse will start an IV and you may receive medication in preparation for surgery, including something to help you relax.  Your family will be able to see you in the Pre-op area.  Two visitors at a time will be allowed in the Pre-op room.  While you are in surgery your family should notify the waiting room  if they leave the waiting room area and provide a contact phone number.    Please be aware that surgery does come with discomfort.  We want to make every effort to control your discomfort so please discuss any uncontrolled symptoms with your nurse.   Your doctor will most likely have prescribed pain medications.      If you are going home after surgery you will receive individualized written care instructions before being discharged.  A responsible adult must drive you to and from the hospital on the day of your surgery and stay with you for 24 hours.        If you have any questions please call Pre-Admission Testing  at [phone].  Deductibles and co-payments are collected on the day of service. Please be prepared to pay the required co-pay, deductible or deposit on the day of service as defined by your plan.

## 2019-12-23 ENCOUNTER — ANESTHESIA EVENT (OUTPATIENT)
Dept: PERIOP | Facility: HOSPITAL | Age: 34
End: 2019-12-23

## 2019-12-23 ENCOUNTER — ANESTHESIA (OUTPATIENT)
Dept: PERIOP | Facility: HOSPITAL | Age: 34
End: 2019-12-23

## 2019-12-23 ENCOUNTER — HOSPITAL ENCOUNTER (OUTPATIENT)
Facility: HOSPITAL | Age: 34
Setting detail: HOSPITAL OUTPATIENT SURGERY
Discharge: HOME | End: 2019-12-23
Attending: ORTHOPAEDIC SURGERY | Admitting: ORTHOPAEDIC SURGERY

## 2019-12-23 VITALS
OXYGEN SATURATION: 98 % | HEART RATE: 62 BPM | SYSTOLIC BLOOD PRESSURE: 119 MMHG | DIASTOLIC BLOOD PRESSURE: 61 MMHG | RESPIRATION RATE: 16 BRPM | TEMPERATURE: 97.6 F

## 2019-12-23 DIAGNOSIS — S83.281D TEAR OF LATERAL MENISCUS OF RIGHT KNEE, CURRENT, UNSPECIFIED TEAR TYPE, SUBSEQUENT ENCOUNTER: ICD-10-CM

## 2019-12-23 PROCEDURE — 81025 URINE PREGNANCY TEST: CPT | Performed by: ANESTHESIOLOGY

## 2019-12-23 PROCEDURE — 25010000002 DEXAMETHASONE PER 1 MG: Performed by: NURSE ANESTHETIST, CERTIFIED REGISTERED

## 2019-12-23 PROCEDURE — 25010000002 ONDANSETRON PER 1 MG: Performed by: NURSE ANESTHETIST, CERTIFIED REGISTERED

## 2019-12-23 PROCEDURE — 29881 ARTHRS KNE SRG MNISECTMY M/L: CPT | Performed by: ORTHOPAEDIC SURGERY

## 2019-12-23 PROCEDURE — 63710000001 PROMETHAZINE PER 25 MG: Performed by: ANESTHESIOLOGY

## 2019-12-23 PROCEDURE — 25010000002 PROPOFOL 10 MG/ML EMULSION: Performed by: NURSE ANESTHETIST, CERTIFIED REGISTERED

## 2019-12-23 PROCEDURE — 25010000002 FENTANYL CITRATE (PF) 100 MCG/2ML SOLUTION: Performed by: ANESTHESIOLOGY

## 2019-12-23 PROCEDURE — 25010000002 MIDAZOLAM PER 1 MG: Performed by: ANESTHESIOLOGY

## 2019-12-23 PROCEDURE — 25010000002 FENTANYL CITRATE (PF) 100 MCG/2ML SOLUTION: Performed by: NURSE ANESTHETIST, CERTIFIED REGISTERED

## 2019-12-23 PROCEDURE — 25010000002 METHYLPREDNISOLONE PER 80 MG: Performed by: ORTHOPAEDIC SURGERY

## 2019-12-23 PROCEDURE — 25010000003 CEFAZOLIN IN DEXTROSE 2-4 GM/100ML-% SOLUTION: Performed by: ORTHOPAEDIC SURGERY

## 2019-12-23 RX ORDER — PROMETHAZINE HYDROCHLORIDE 25 MG/1
25 SUPPOSITORY RECTAL ONCE AS NEEDED
Status: COMPLETED | OUTPATIENT
Start: 2019-12-23 | End: 2019-12-23

## 2019-12-23 RX ORDER — DIPHENHYDRAMINE HCL 25 MG
25 CAPSULE ORAL
Status: DISCONTINUED | OUTPATIENT
Start: 2019-12-23 | End: 2019-12-23 | Stop reason: HOSPADM

## 2019-12-23 RX ORDER — PROMETHAZINE HYDROCHLORIDE 25 MG/ML
6.25 INJECTION, SOLUTION INTRAMUSCULAR; INTRAVENOUS
Status: DISCONTINUED | OUTPATIENT
Start: 2019-12-23 | End: 2019-12-23 | Stop reason: HOSPADM

## 2019-12-23 RX ORDER — FENTANYL CITRATE 50 UG/ML
INJECTION, SOLUTION INTRAMUSCULAR; INTRAVENOUS AS NEEDED
Status: DISCONTINUED | OUTPATIENT
Start: 2019-12-23 | End: 2019-12-23 | Stop reason: SURG

## 2019-12-23 RX ORDER — SODIUM CHLORIDE, SODIUM LACTATE, POTASSIUM CHLORIDE, CALCIUM CHLORIDE 600; 310; 30; 20 MG/100ML; MG/100ML; MG/100ML; MG/100ML
100 INJECTION, SOLUTION INTRAVENOUS CONTINUOUS
Status: DISCONTINUED | OUTPATIENT
Start: 2019-12-23 | End: 2019-12-23 | Stop reason: HOSPADM

## 2019-12-23 RX ORDER — SODIUM CHLORIDE, SODIUM LACTATE, POTASSIUM CHLORIDE, CALCIUM CHLORIDE 600; 310; 30; 20 MG/100ML; MG/100ML; MG/100ML; MG/100ML
9 INJECTION, SOLUTION INTRAVENOUS CONTINUOUS
Status: DISCONTINUED | OUTPATIENT
Start: 2019-12-23 | End: 2019-12-23 | Stop reason: HOSPADM

## 2019-12-23 RX ORDER — SODIUM CHLORIDE 0.9 % (FLUSH) 0.9 %
10 SYRINGE (ML) INJECTION EVERY 12 HOURS SCHEDULED
Status: DISCONTINUED | OUTPATIENT
Start: 2019-12-23 | End: 2019-12-23 | Stop reason: HOSPADM

## 2019-12-23 RX ORDER — FENTANYL CITRATE 50 UG/ML
50 INJECTION, SOLUTION INTRAMUSCULAR; INTRAVENOUS
Status: DISCONTINUED | OUTPATIENT
Start: 2019-12-23 | End: 2019-12-23 | Stop reason: HOSPADM

## 2019-12-23 RX ORDER — ACETAMINOPHEN 325 MG/1
650 TABLET ORAL ONCE AS NEEDED
Status: DISCONTINUED | OUTPATIENT
Start: 2019-12-23 | End: 2019-12-23 | Stop reason: HOSPADM

## 2019-12-23 RX ORDER — ONDANSETRON 2 MG/ML
4 INJECTION INTRAMUSCULAR; INTRAVENOUS ONCE AS NEEDED
Status: DISCONTINUED | OUTPATIENT
Start: 2019-12-23 | End: 2019-12-23 | Stop reason: HOSPADM

## 2019-12-23 RX ORDER — DIPHENHYDRAMINE HYDROCHLORIDE 50 MG/ML
12.5 INJECTION INTRAMUSCULAR; INTRAVENOUS
Status: DISCONTINUED | OUTPATIENT
Start: 2019-12-23 | End: 2019-12-23 | Stop reason: HOSPADM

## 2019-12-23 RX ORDER — ONDANSETRON 2 MG/ML
INJECTION INTRAMUSCULAR; INTRAVENOUS AS NEEDED
Status: DISCONTINUED | OUTPATIENT
Start: 2019-12-23 | End: 2019-12-23 | Stop reason: SURG

## 2019-12-23 RX ORDER — HYDROCODONE BITARTRATE AND ACETAMINOPHEN 7.5; 325 MG/1; MG/1
1 TABLET ORAL ONCE AS NEEDED
Status: COMPLETED | OUTPATIENT
Start: 2019-12-23 | End: 2019-12-23

## 2019-12-23 RX ORDER — PROMETHAZINE HYDROCHLORIDE 25 MG/1
25 TABLET ORAL ONCE AS NEEDED
Status: COMPLETED | OUTPATIENT
Start: 2019-12-23 | End: 2019-12-23

## 2019-12-23 RX ORDER — MIDAZOLAM HYDROCHLORIDE 1 MG/ML
2 INJECTION INTRAMUSCULAR; INTRAVENOUS
Status: DISCONTINUED | OUTPATIENT
Start: 2019-12-23 | End: 2019-12-23 | Stop reason: HOSPADM

## 2019-12-23 RX ORDER — HYDRALAZINE HYDROCHLORIDE 20 MG/ML
5 INJECTION INTRAMUSCULAR; INTRAVENOUS
Status: DISCONTINUED | OUTPATIENT
Start: 2019-12-23 | End: 2019-12-23 | Stop reason: HOSPADM

## 2019-12-23 RX ORDER — OXYCODONE AND ACETAMINOPHEN 7.5; 325 MG/1; MG/1
1 TABLET ORAL ONCE AS NEEDED
Status: DISCONTINUED | OUTPATIENT
Start: 2019-12-23 | End: 2019-12-23 | Stop reason: HOSPADM

## 2019-12-23 RX ORDER — NALOXONE HCL 0.4 MG/ML
0.4 VIAL (ML) INJECTION AS NEEDED
Status: DISCONTINUED | OUTPATIENT
Start: 2019-12-23 | End: 2019-12-23 | Stop reason: HOSPADM

## 2019-12-23 RX ORDER — PROMETHAZINE HYDROCHLORIDE 25 MG/ML
6.25 INJECTION, SOLUTION INTRAMUSCULAR; INTRAVENOUS ONCE AS NEEDED
Status: COMPLETED | OUTPATIENT
Start: 2019-12-23 | End: 2019-12-23

## 2019-12-23 RX ORDER — MIDAZOLAM HYDROCHLORIDE 1 MG/ML
1 INJECTION INTRAMUSCULAR; INTRAVENOUS
Status: DISCONTINUED | OUTPATIENT
Start: 2019-12-23 | End: 2019-12-23 | Stop reason: HOSPADM

## 2019-12-23 RX ORDER — HYDROMORPHONE HYDROCHLORIDE 1 MG/ML
0.5 INJECTION, SOLUTION INTRAMUSCULAR; INTRAVENOUS; SUBCUTANEOUS
Status: DISCONTINUED | OUTPATIENT
Start: 2019-12-23 | End: 2019-12-23 | Stop reason: HOSPADM

## 2019-12-23 RX ORDER — NALOXONE HCL 0.4 MG/ML
0.2 VIAL (ML) INJECTION AS NEEDED
Status: DISCONTINUED | OUTPATIENT
Start: 2019-12-23 | End: 2019-12-23 | Stop reason: HOSPADM

## 2019-12-23 RX ORDER — DEXAMETHASONE SODIUM PHOSPHATE 10 MG/ML
INJECTION INTRAMUSCULAR; INTRAVENOUS AS NEEDED
Status: DISCONTINUED | OUTPATIENT
Start: 2019-12-23 | End: 2019-12-23 | Stop reason: SURG

## 2019-12-23 RX ORDER — ACETAMINOPHEN 650 MG/1
650 SUPPOSITORY RECTAL ONCE AS NEEDED
Status: DISCONTINUED | OUTPATIENT
Start: 2019-12-23 | End: 2019-12-23 | Stop reason: HOSPADM

## 2019-12-23 RX ORDER — CEFAZOLIN SODIUM 2 G/100ML
2 INJECTION, SOLUTION INTRAVENOUS ONCE
Status: COMPLETED | OUTPATIENT
Start: 2019-12-23 | End: 2019-12-23

## 2019-12-23 RX ORDER — EPHEDRINE SULFATE 50 MG/ML
INJECTION, SOLUTION INTRAVENOUS AS NEEDED
Status: DISCONTINUED | OUTPATIENT
Start: 2019-12-23 | End: 2019-12-23 | Stop reason: SURG

## 2019-12-23 RX ORDER — LIDOCAINE HYDROCHLORIDE 20 MG/ML
INJECTION, SOLUTION INFILTRATION; PERINEURAL AS NEEDED
Status: DISCONTINUED | OUTPATIENT
Start: 2019-12-23 | End: 2019-12-23 | Stop reason: SURG

## 2019-12-23 RX ORDER — HYDROCODONE BITARTRATE AND ACETAMINOPHEN 5; 325 MG/1; MG/1
1 TABLET ORAL EVERY 4 HOURS PRN
Qty: 45 TABLET | Refills: 0 | Status: SHIPPED | OUTPATIENT
Start: 2019-12-23 | End: 2020-06-19

## 2019-12-23 RX ORDER — NALBUPHINE HCL 10 MG/ML
10 AMPUL (ML) INJECTION EVERY 4 HOURS PRN
Status: DISCONTINUED | OUTPATIENT
Start: 2019-12-23 | End: 2019-12-23 | Stop reason: HOSPADM

## 2019-12-23 RX ORDER — ALBUTEROL SULFATE 2.5 MG/3ML
2.5 SOLUTION RESPIRATORY (INHALATION) ONCE AS NEEDED
Status: DISCONTINUED | OUTPATIENT
Start: 2019-12-23 | End: 2019-12-23 | Stop reason: HOSPADM

## 2019-12-23 RX ORDER — NALBUPHINE HCL 10 MG/ML
2 AMPUL (ML) INJECTION EVERY 4 HOURS PRN
Status: DISCONTINUED | OUTPATIENT
Start: 2019-12-23 | End: 2019-12-23 | Stop reason: HOSPADM

## 2019-12-23 RX ORDER — SODIUM CHLORIDE, SODIUM LACTATE, POTASSIUM CHLORIDE, AND CALCIUM CHLORIDE .6; .31; .03; .02 G/100ML; G/100ML; G/100ML; G/100ML
IRRIGANT IRRIGATION AS NEEDED
Status: DISCONTINUED | OUTPATIENT
Start: 2019-12-23 | End: 2019-12-23 | Stop reason: HOSPADM

## 2019-12-23 RX ORDER — HYDROMORPHONE HYDROCHLORIDE 1 MG/ML
0.25 INJECTION, SOLUTION INTRAMUSCULAR; INTRAVENOUS; SUBCUTANEOUS
Status: DISCONTINUED | OUTPATIENT
Start: 2019-12-23 | End: 2019-12-23 | Stop reason: HOSPADM

## 2019-12-23 RX ORDER — HYDROCODONE BITARTRATE AND ACETAMINOPHEN 5; 325 MG/1; MG/1
1 TABLET ORAL ONCE AS NEEDED
Status: DISCONTINUED | OUTPATIENT
Start: 2019-12-23 | End: 2019-12-23 | Stop reason: HOSPADM

## 2019-12-23 RX ORDER — PROMETHAZINE HYDROCHLORIDE 25 MG/1
25 SUPPOSITORY RECTAL ONCE AS NEEDED
Status: DISCONTINUED | OUTPATIENT
Start: 2019-12-23 | End: 2019-12-23 | Stop reason: HOSPADM

## 2019-12-23 RX ORDER — EPHEDRINE SULFATE 50 MG/ML
5 INJECTION, SOLUTION INTRAVENOUS ONCE AS NEEDED
Status: DISCONTINUED | OUTPATIENT
Start: 2019-12-23 | End: 2019-12-23 | Stop reason: HOSPADM

## 2019-12-23 RX ORDER — PROMETHAZINE HYDROCHLORIDE 25 MG/ML
12.5 INJECTION, SOLUTION INTRAMUSCULAR; INTRAVENOUS ONCE AS NEEDED
Status: DISCONTINUED | OUTPATIENT
Start: 2019-12-23 | End: 2019-12-23 | Stop reason: HOSPADM

## 2019-12-23 RX ORDER — LABETALOL HYDROCHLORIDE 5 MG/ML
5 INJECTION, SOLUTION INTRAVENOUS
Status: DISCONTINUED | OUTPATIENT
Start: 2019-12-23 | End: 2019-12-23 | Stop reason: HOSPADM

## 2019-12-23 RX ORDER — FLUMAZENIL 0.1 MG/ML
0.2 INJECTION INTRAVENOUS AS NEEDED
Status: DISCONTINUED | OUTPATIENT
Start: 2019-12-23 | End: 2019-12-23 | Stop reason: HOSPADM

## 2019-12-23 RX ORDER — PROMETHAZINE HYDROCHLORIDE 25 MG/1
25 TABLET ORAL ONCE AS NEEDED
Status: DISCONTINUED | OUTPATIENT
Start: 2019-12-23 | End: 2019-12-23 | Stop reason: HOSPADM

## 2019-12-23 RX ORDER — LIDOCAINE HYDROCHLORIDE 10 MG/ML
0.5 INJECTION, SOLUTION EPIDURAL; INFILTRATION; INTRACAUDAL; PERINEURAL ONCE AS NEEDED
Status: DISCONTINUED | OUTPATIENT
Start: 2019-12-23 | End: 2019-12-23 | Stop reason: HOSPADM

## 2019-12-23 RX ORDER — SODIUM CHLORIDE 0.9 % (FLUSH) 0.9 %
10 SYRINGE (ML) INJECTION AS NEEDED
Status: DISCONTINUED | OUTPATIENT
Start: 2019-12-23 | End: 2019-12-23 | Stop reason: HOSPADM

## 2019-12-23 RX ORDER — ACETAMINOPHEN 500 MG
500 TABLET ORAL ONCE
Status: COMPLETED | OUTPATIENT
Start: 2019-12-23 | End: 2019-12-23

## 2019-12-23 RX ORDER — PROPOFOL 10 MG/ML
VIAL (ML) INTRAVENOUS AS NEEDED
Status: DISCONTINUED | OUTPATIENT
Start: 2019-12-23 | End: 2019-12-23 | Stop reason: SURG

## 2019-12-23 RX ADMIN — CEFAZOLIN SODIUM 2 G: 2 INJECTION, SOLUTION INTRAVENOUS at 06:58

## 2019-12-23 RX ADMIN — MIDAZOLAM 1 MG: 1 INJECTION INTRAMUSCULAR; INTRAVENOUS at 06:54

## 2019-12-23 RX ADMIN — EPHEDRINE SULFATE 5 MG: 50 INJECTION INTRAVENOUS at 07:16

## 2019-12-23 RX ADMIN — SODIUM CHLORIDE, POTASSIUM CHLORIDE, SODIUM LACTATE AND CALCIUM CHLORIDE 9 ML/HR: 600; 310; 30; 20 INJECTION, SOLUTION INTRAVENOUS at 06:54

## 2019-12-23 RX ADMIN — EPHEDRINE SULFATE 5 MG: 50 INJECTION INTRAVENOUS at 07:15

## 2019-12-23 RX ADMIN — FENTANYL CITRATE 50 MCG: 50 INJECTION, SOLUTION INTRAMUSCULAR; INTRAVENOUS at 08:33

## 2019-12-23 RX ADMIN — DEXAMETHASONE SODIUM PHOSPHATE 8 MG: 10 INJECTION INTRAMUSCULAR; INTRAVENOUS at 07:06

## 2019-12-23 RX ADMIN — PROMETHAZINE HYDROCHLORIDE 25 MG: 25 TABLET ORAL at 08:32

## 2019-12-23 RX ADMIN — ONDANSETRON HYDROCHLORIDE 4 MG: 2 SOLUTION INTRAMUSCULAR; INTRAVENOUS at 07:39

## 2019-12-23 RX ADMIN — FENTANYL CITRATE 50 MCG: 50 INJECTION INTRAMUSCULAR; INTRAVENOUS at 07:06

## 2019-12-23 RX ADMIN — ACETAMINOPHEN 500 MG: 500 TABLET, FILM COATED ORAL at 06:54

## 2019-12-23 RX ADMIN — HYDROCODONE BITARTRATE AND ACETAMINOPHEN 1 TABLET: 7.5; 325 TABLET ORAL at 08:32

## 2019-12-23 RX ADMIN — PROPOFOL 200 MG: 10 INJECTION, EMULSION INTRAVENOUS at 07:03

## 2019-12-23 RX ADMIN — FENTANYL CITRATE 100 MCG: 50 INJECTION INTRAMUSCULAR; INTRAVENOUS at 07:03

## 2019-12-23 RX ADMIN — LIDOCAINE HYDROCHLORIDE 100 MG: 20 INJECTION, SOLUTION INFILTRATION; PERINEURAL at 07:03

## 2019-12-23 RX ADMIN — FENTANYL CITRATE 50 MCG: 50 INJECTION INTRAMUSCULAR; INTRAVENOUS at 07:44

## 2019-12-23 NOTE — ANESTHESIA PROCEDURE NOTES
Airway  Urgency: elective    Date/Time: 12/23/2019 7:04 AM  Airway not difficult    General Information and Staff    Patient location during procedure: OR  Anesthesiologist: Khadar Doyle MD  CRNA: Raya Villegas CRNA    Indications and Patient Condition  Indications for airway management: airway protection    Preoxygenated: yes  Mask difficulty assessment: 1 - vent by mask    Final Airway Details  Final airway type: supraglottic airway      Successful airway: classic  Size 4    Number of attempts at approach: 1  Assessment: lips, teeth, and gum same as pre-op    Additional Comments  PreO2, IV induction, easy mask, LMA placed w/o difficulty, cuff air placed, secured in placed, EBBSH, +etCO2, atraumatic, teeth and lips as preop.

## 2019-12-23 NOTE — ANESTHESIA PREPROCEDURE EVALUATION
Anesthesia Evaluation     no history of anesthetic complications:  NPO Solid Status: > 8 hours             Airway   Mallampati: I  TM distance: >3 FB  Neck ROM: full  No difficulty expected  Dental - normal exam     Pulmonary - normal exam   (+) asthma,  (-) not a smoker  Cardiovascular   Exercise tolerance: excellent (>7 METS)    Rhythm: regular        Neuro/Psych  (+) psychiatric history Depression,     GI/Hepatic/Renal/Endo    (+)  GERD well controlled,      Musculoskeletal     Abdominal  - normal exam   Substance History      OB/GYN          Other                        Anesthesia Plan    ASA 2     general   (  D/W R&B of GA including but not limited to: heart, lung, liver, kidney, neurologic problems, positioning injuries, dental damage, corneal abrasion and TMJ.  .)  intravenous induction

## 2019-12-23 NOTE — OP NOTE
Operative Note      Facility: [facility]  Patient Name: Jenna Jones  YOB: 1985  Date: 12/23/2019  Medical Record Number: [id number]      Pre-op Diagnosis:   Other tear of lateral meniscus of right knee as current injury, subsequent encounter [S83.281D]    Post-Op Diagnosis Codes:     Lateral meniscus tear, grade 3 changes involving the majority of the lateral femoral condyle fissuring of the patellofemoral joint    Procedure(s):  Right KNEE ARTHROSCOPY DERBRIDEMENT OF ARTHRITIS CHONDROPLASTY OF THE LATERAL FEMORAL CONDYLE PARTIAL LATERAL MENISECTOMY    Surgeon(s):  Janis Stern MD    Anesthesia: General  Anesthesiologist: Khadar Doyle MD  CRNA: Raya Villegas CRNA    Staff:   Circulator: Beverley Mosqueda RN  Scrub Person: Siena Talavera    Assistants : none      Estimated Blood Loss: 5 cc    Specimens:    none     Drains: None    Findings: See Dictation    Complications: None      Indication for procedure: This patient has had a several month history of knee pain and has an exam and an MRI which are consistent with meniscal pathology. They understand all options and wished to proceed with arthroscopy.      Description of procedure: The patient was taken to the operating room. They were placed supine on the operating room table. After induction of adequate LMA anesthesia, IV antibiotics the underwent exam under anesthesia was symmetric full range of motion. Nonsterile tourniquet was applied patient was placed in the thigh martinez all prominent areas well padded and into the table dropped. The leg was prepped and draped in usual sterile fashion. Standard lateral incision was made with 11 blade. Blunt trocar penetrated into the joint scope followed in the evaluation began the patella appeared to sit centrally within the trochlear groove she did have some fissuring on both sides of this joint but the patella appeared to sit centrally within the trochlear groove the gutters  were normal and then entered the medial compartment under spinal needle localization direct visualization a medial portal was established should be noted that upon making my lateral incision for the portal I did have cyst material that returned decompressing that lateral meniscal cyst.  Evaluation of the medial compartment the medial meniscus condyle and tibial plateau were normal the notch was normal then entered the lateral compartment she had evidence of prior lateral meniscectomy but did have a tear of the remnant of the lateral meniscus.  This was resected with various angled biters baskets motorized twyla multiple Apollo device.  She had diffuse grade 3 changes on the lateral femoral condyle involving almost the entirety of the condyle and several areas areas that were loose at these were debrided with a motorized shaver             At this point everything was thoroughly irrigated it was suctioned all 3 compartments, the gutters the suprapatellar pouch were all evaluated there was no further acute pathology seen.  Everything was thoroughly irrigated it was injected with Marcaine Depo-Medrol.  The portals were closed with 3-0 nylon interrupted fashion.  Sterile dressings and Ace wraps were applied.  The patient tolerated the procedure well and was taken to recovery room in good condition.  All sponge and needle count were correct                    Date: 12/23/2019  Time: 7:55 AM

## 2019-12-23 NOTE — ANESTHESIA POSTPROCEDURE EVALUATION
Patient: Jenna Jones    Procedure Summary     Date:  12/23/19 Room / Location:   [facility] OR  /  [facility]    Anesthesia Start:  0657 Anesthesia Stop:  0754    Procedure:  KNEE ARTHROSCOPY DERBRIDEMENT OF ARTHRITIS PARTIAL LATERAL MENISECTOMY (Right Knee) Diagnosis:       Other tear of lateral meniscus of right knee as current injury, subsequent encounter      (Other tear of lateral meniscus of right knee as current injury, subsequent encounter [S88.301N])    Surgeon:  Janis Stern MD Provider:  Vivek, Khadar Henson MD    Anesthesia Type:  general ASA Status:  2          Anesthesia Type: general    Vitals  Vitals Value Taken Time   /64 12/23/2019  8:45 AM   Temp 36.4 °C (97.6 °F) 12/23/2019  7:54 AM   Pulse 77 12/23/2019  8:51 AM   Resp 16 12/23/2019  8:45 AM   SpO2 98 % 12/23/2019  8:52 AM   Vitals shown include unvalidated device data.        Post Anesthesia Care and Evaluation    Patient location during evaluation: bedside  Patient participation: complete - patient participated  Level of consciousness: awake and alert  Pain management: adequate  Airway patency: patent  Anesthetic complications: No anesthetic complications    Cardiovascular status: acceptable  Respiratory status: acceptable  Hydration status: acceptable    Comments: /61 (BP Location: Right arm, Patient Position: Lying)   Pulse 62   Temp 36.4 °C (97.6 °F) (Temporal)   Resp 16   LMP 12/10/2019   SpO2 98%

## 2019-12-23 NOTE — H&P
History & Physical       Patient: Jenna Jones    Date of Admission: 12/23/2019  5:51 AM    YOB: 1985    Medical Record Number: [id number]    Attending Physician: Janis Stern MD        Chief Complaints: Other tear of lateral meniscus of right knee as current injury, subsequent encounter [S83.281D]      History of Present Illness: Patient is several month history of right knee pain she is status post arthroscopy several years before she is had recurrent symptoms.  She has an MRI which shows a lateral meniscus tear as well as degenerative changes about the lateral compartment.  Her symptoms seem mechanical she presents for arthroscopy allergies:   Allergies   Allergen Reactions   • Adhesive Tape Other (See Comments)     TEARS SKIN       Medications:   Home Medications:  No current facility-administered medications on file prior to encounter.      Current Outpatient Medications on File Prior to Encounter   Medication Sig   • pantoprazole (PROTONIX) 40 MG EC tablet Take 1 tablet by mouth 2 (Two) Times a Day Before Meals. (Patient taking differently: Take 40 mg by mouth 2 (Two) Times a Day As Needed (S/S OF REFLUX).)   • cetirizine (zyrTEC) 10 MG tablet Take 10 mg by mouth Daily.     Current Medications:  Scheduled Meds:  ceFAZolin 2 g Intravenous Once   sodium chloride 10 mL Intravenous Q12H     Continuous Infusions:  lactated ringers 9 mL/hr Last Rate: 9 mL/hr (12/23/19 0654)     PRN Meds:.•  acetaminophen **OR** acetaminophen  •  diphenhydrAMINE  •  fentanyl  •  hydrALAZINE  •  HYDROcodone-acetaminophen  •  HYDROmorphone  •  lidocaine PF 1%  •  metoprolol tartrate  •  midazolam **OR** midazolam  •  nalbuphine **OR** nalbuphine  •  naloxone  •  promethazine **OR** promethazine **OR** promethazine **OR** promethazine  •  sodium chloride    Past Medical History:   Diagnosis Date   • Anxiety    • Arthritis     MICHA KNEES   • Asthma     CHILDHOOD   • Chronic constipation    • Depression    •  Environmental and seasonal allergies    • Gastric erosion     HIST OF   • GERD (gastroesophageal reflux disease)    • History of anemia     IRON DEF   • RAD (reactive airway disease)    • Right knee meniscal tear         Past Surgical History:   Procedure Laterality Date   • CERVICAL CONIZATION     • COLONOSCOPY N/A 7/25/2018    A few erosions at 45 cm proximal to the anus   • ENDOSCOPY N/A 7/25/2018    Small hiatal hernia, gastritis   • KNEE ARTHROSCOPY      LEFT AND RIGHT   • LIPOMA EXCISION      ankle   • ROTATOR CUFF REPAIR Right         Social History     Occupational History   • Not on file   Tobacco Use   • Smoking status: Never Smoker   • Smokeless tobacco: Never Used   Substance and Sexual Activity   • Alcohol use: Yes     Comment: rare    • Drug use: No   • Sexual activity: Not on file    Social History     Social History Narrative   • Not on file        Family History   Problem Relation Age of Onset   • Hypertension Other    • Diabetes Other    • Stomach cancer Other    • Prostate cancer Other    • Colon polyps Mother    • Green's esophagus Father    • Colon polyps Maternal Aunt    • Colon cancer Maternal Uncle    • Colon cancer Maternal Grandmother    • Colon polyps Maternal Grandmother    • Colon cancer Cousin    • Stomach cancer Paternal Grandfather    • No Known Problems Sister    • No Known Problems Brother    • No Known Problems Paternal Aunt    • No Known Problems Paternal Uncle    • No Known Problems Maternal Grandfather    • No Known Problems Paternal Grandmother    • Celiac disease Neg Hx    • Cirrhosis Neg Hx    • Crohn's disease Neg Hx    • Cystic fibrosis Neg Hx    • Esophageal cancer Neg Hx    • Hemochromatosis Neg Hx    • Inflammatory bowel disease Neg Hx    • Irritable bowel syndrome Neg Hx    • Liver cancer Neg Hx    • Liver disease Neg Hx    • Rectal cancer Neg Hx    • Ulcerative colitis Neg Hx    • Herber's disease Neg Hx    • Alcohol abuse Neg Hx    • Pancreatitis Neg Hx    •  Anesthesia problems Neg Hx    • Broken bones Neg Hx    • Cancer Neg Hx    • Clotting disorder Neg Hx    • Collagen disease Neg Hx    • Dislocations Neg Hx    • Osteoporosis Neg Hx    • Rheumatologic disease Neg Hx    • Scoliosis Neg Hx    • Severe sprains Neg Hx        Review of Systems      Physical Exam: 34 y.o. female  General Appearance:    Alert, cooperative, in no acute distress                    Vitals:    12/23/19 0631   BP: 117/65   BP Location: Left arm   Patient Position: Lying   Pulse: 54   Resp: 16   Temp: 97.9 °F (36.6 °C)   TempSrc: Oral   SpO2: 99%        Head:    Normocephalic, without obvious abnormality, atraumatic   Eyes:            conjunctivae and sclerae normal, no pallor, corneas clear,    Ears:    Ears appear intact with no abnormalities noted   Throat:   No oral lesions, no thrush, oral mucosa moist   Neck:   No adenopathy, supple, trachea midline, no thyromegaly,    Back:     No kyphosis present, no scoliosis present, no skin lesions,      erythema or scars, no tenderness to percussion or                   palpation,   range of motion normal   Lungs:     Clear to auscultation,respirations regular, even and                  unlabored    Heart:    Regular rhythm and normal rate               Chest Wall:    No abnormalities observed   Abdomen:     Normal bowel sounds, no masses, no organomegaly, soft        non-tender, non-distended, no guarding, no rebound                tenderness   Rectal:     Deferred   Extremities:    Moves all extremities well, no edema,   no cyanosis, no redness   Pulses:   Pulses palpable and equal bilaterally   Skin:   No bleeding, bruising or rash   Lymph nodes:   No palpable adenopathy   Neurologic:   Appears neurologic intact             Assessment:  Patient Active Problem List   Diagnosis   • Pain   • Pain in both lower extremities   • Early satiety   • Nausea   • Epigastric pain   • Tear of lateral meniscus of right knee, current           Plan: All risks,  benefits and alternatives were discussed.  Risks including to but not exclusive to anesthetic complications, including death, MI, CVA, infection, bleeding DVT, PE,  fracture, residual pain and need for future surgery.  Patient understood all and agrees to proceed.

## 2020-01-09 ENCOUNTER — OFFICE VISIT (OUTPATIENT)
Dept: ORTHOPEDIC SURGERY | Facility: CLINIC | Age: 35
End: 2020-01-09

## 2020-01-09 VITALS — BODY MASS INDEX: 31.01 KG/M2 | HEIGHT: 68 IN | TEMPERATURE: 97.8 F | WEIGHT: 204.6 LBS

## 2020-01-09 DIAGNOSIS — Z98.890 S/P ARTHROSCOPY OF KNEE: Primary | ICD-10-CM

## 2020-01-09 PROCEDURE — 99024 POSTOP FOLLOW-UP VISIT: CPT | Performed by: ORTHOPAEDIC SURGERY

## 2020-01-09 NOTE — PROGRESS NOTES
Right Knee Scope follow Up 1st Visit      Patient: Jenna Jones        YOB: 1985      Chief Complaints: right  knee pain      History of Present Illness: Pt is here f/u knee arthroscopy she states he is doing good her pain is good she is happy with where she is anxious to progress her activities        Allergies:   Allergies   Allergen Reactions   • Adhesive Tape Other (See Comments)     TEARS SKIN       Medications:   Home Medications:  Current Outpatient Medications on File Prior to Visit   Medication Sig   • cetirizine (zyrTEC) 10 MG tablet Take 10 mg by mouth Daily.   • HYDROcodone-acetaminophen (NORCO) 5-325 MG per tablet Take 1 tablet by mouth Every 4 (Four) Hours As Needed for Severe Pain .   • pantoprazole (PROTONIX) 40 MG EC tablet Take 1 tablet by mouth 2 (Two) Times a Day Before Meals. (Patient taking differently: Take 40 mg by mouth 2 (Two) Times a Day As Needed (S/S OF REFLUX).)     No current facility-administered medications on file prior to visit.      Current Medications:  Scheduled Meds:  Continuous Infusions:  No current facility-administered medications for this visit.   PRN Meds:.          Physical Exam: 34 y.o. female  General Appearance:    Alert, cooperative, in no acute distress                 There were no vitals filed for this visit.   Patient is alert and oriented ×3 no acute distress normal mood physical exam.  Physical exam of the knee, incisions looked good there is no erythema, calf is soft and non-tender.  No sign or sx of DVT      Assessment  S/P knee scope.  I did review intraoperative findings and arthroscopic pictures with the patient.          Plan: To remove sutures today place Steri-Strips and start into  physical therapy and I will have thrm follow up in 4 weeks.

## 2020-01-10 ENCOUNTER — TELEPHONE (OUTPATIENT)
Dept: ORTHOPEDIC SURGERY | Facility: CLINIC | Age: 35
End: 2020-01-10

## 2020-01-10 NOTE — TELEPHONE ENCOUNTER
CALLED [facility] AND PER JAZMINEU HER COVERAGE TERMED 12/31/19.  CALLED PATIENT AND LEFT MESSAGE TO CALL ME WITH HER UPDATED INSURANCE INFORMATION/DM

## 2020-01-23 ENCOUNTER — APPOINTMENT (OUTPATIENT)
Dept: WOMENS IMAGING | Facility: HOSPITAL | Age: 35
End: 2020-01-23

## 2020-01-23 PROCEDURE — 77067 SCR MAMMO BI INCL CAD: CPT | Performed by: RADIOLOGY

## 2020-01-23 PROCEDURE — 77063 BREAST TOMOSYNTHESIS BI: CPT | Performed by: RADIOLOGY

## 2020-03-17 ENCOUNTER — TELEPHONE (OUTPATIENT)
Dept: GASTROENTEROLOGY | Facility: CLINIC | Age: 35
End: 2020-03-17

## 2020-03-17 RX ORDER — PANTOPRAZOLE SODIUM 40 MG/1
TABLET, DELAYED RELEASE ORAL
Qty: 180 TABLET | Refills: 1 | Status: SHIPPED | OUTPATIENT
Start: 2020-03-17 | End: 2021-08-16

## 2020-03-17 NOTE — TELEPHONE ENCOUNTER
Called pt and pt made yearly f/u with Dr Duarte on 06/23 at 315p.  Advised pt we will escribe her pantoprazole 40mg po bid to her [facility] pharmacy. Pt verb understanding.

## 2020-03-17 NOTE — TELEPHONE ENCOUNTER
----- Message from Sunshine Delatorre Rep sent at 3/17/2020  9:15 AM EDT -----  Regarding: Pharmacy Change   Contact: [phone]  Pt is trying to get her refill for pantoprazole, she new a pharmacy which is now [facility] in [place], [address]

## 2020-04-08 ENCOUNTER — TELEPHONE (OUTPATIENT)
Dept: GASTROENTEROLOGY | Facility: CLINIC | Age: 35
End: 2020-04-08

## 2020-06-19 ENCOUNTER — OFFICE VISIT (OUTPATIENT)
Dept: ORTHOPEDIC SURGERY | Facility: CLINIC | Age: 35
End: 2020-06-19

## 2020-06-19 VITALS — BODY MASS INDEX: 31.01 KG/M2 | TEMPERATURE: 97.9 F | HEIGHT: 68 IN | WEIGHT: 204.6 LBS

## 2020-06-19 DIAGNOSIS — M19.90 ARTHRITIS: Primary | ICD-10-CM

## 2020-06-19 PROCEDURE — 20610 DRAIN/INJ JOINT/BURSA W/O US: CPT | Performed by: ORTHOPAEDIC SURGERY

## 2020-06-19 PROCEDURE — 99212 OFFICE O/P EST SF 10 MIN: CPT | Performed by: ORTHOPAEDIC SURGERY

## 2020-06-19 RX ADMIN — METHYLPREDNISOLONE ACETATE 80 MG: 80 INJECTION, SUSPENSION INTRA-ARTICULAR; INTRALESIONAL; INTRAMUSCULAR; SOFT TISSUE at 16:37

## 2020-06-19 NOTE — PROGRESS NOTES
Right Knee Scope follow Up       Patient: Jenna Jones        YOB: 1985      Chief Complaints: Right knee pain      History of Present Illness: Pt is here f/u knee arthroscopy in December of last year she was doing okay was trying to do more exercise more activity and her knees have been bothering her right greater than left she is status post knee arthroscopy was found to have lateral meniscus tear and some degenerative changes.  She is getting ready to go on vacation would like to do something to try to decrease her symptoms      Allergies:   Allergies   Allergen Reactions   • Adhesive Tape Other (See Comments)     TEARS SKIN       Medications:   Home Medications:  Current Outpatient Medications on File Prior to Visit   Medication Sig   • cetirizine (zyrTEC) 10 MG tablet Take 10 mg by mouth Daily.   • HYDROcodone-acetaminophen (NORCO) 5-325 MG per tablet Take 1 tablet by mouth Every 4 (Four) Hours As Needed for Severe Pain .   • hydrocortisone (ANUSOL-HC) 2.5 % rectal cream Insert  into the rectum 4 (Four) Times a Day As Needed for Hemorrhoids (rectal discomfort).   • pantoprazole (PROTONIX) 40 MG EC tablet TAKE ONE TABLET BY MOUTH TWICE A DAY BEFORE MEALS     No current facility-administered medications on file prior to visit.      Current Medications:  Scheduled Meds:  Continuous Infusions:  No current facility-administered medications for this visit.   PRN Meds:.          Physical Exam: 35 y.o. female  General Appearance:    Alert, cooperative, in no acute distress                 There were no vitals filed for this visit.   Patient is alert and oriented ×3 no acute distress normal mood physical exam.  Physical exam of the knee, incisions looked good there is no erythema, calf is soft and non-tender.  No sign or sx of DVT  physical exam the right  knee she has mild effusion no overlying skin changes no lymphedema no lymphadenopathy.  She is sitting in a genu valgum position.  She has -5°  of extension flexion is to 125 she has palpable tenderness laterally more than medially and crepitus with range of motion.  Her calf is soft and nontender    physical exam the left knee she has mild effusion no overlying skin changes no lymphedema no lymphadenopathy.  She is sitting in a genu valgum position.  She has -5° of extension flexion is to 125 she has palpable tenderness laterally more than medially and crepitus with range of motion.  Her calf is soft and nontender  I did review x-rays from previous she does have some mild genu valgum and lateral compartment OA no new x-rays were taken    Assessment  S/P knee scope.  Having some increase in symptoms in both she does have known degenerative changes laterally on both she is trying to increase her activity decrease her weight and she is getting ready go on vacation I think injections are quite reasonable    Plan: Continue with strengthening, progression of activities talked about managing and maintaining an ideal body weight      sLarge Joint Arthrocentesis: R knee  Date/Time: 6/19/2020 4:37 PM  Consent given by: patient  Site marked: site marked  Timeout: Immediately prior to procedure a time out was called to verify the correct patient, procedure, equipment, support staff and site/side marked as required   Supporting Documentation  Indications: pain   Procedure Details  Location: knee - R knee  Needle gauge: 21.  Approach: anteromedial  Medications administered: 80 mg methylPREDNISolone acetate 80 MG/ML; 4 mL lidocaine (cardiac)  Patient tolerance: patient tolerated the procedure well with no immediate complications    Large Joint Arthrocentesis: L knee  Date/Time: 6/19/2020 4:37 PM  Consent given by: patient  Site marked: site marked  Timeout: Immediately prior to procedure a time out was called to verify the correct patient, procedure, equipment, support staff and site/side marked as required   Supporting Documentation  Indications: pain   Procedure  Details  Location: knee - L knee  Needle gauge: 21.  Approach: anteromedial  Medications administered: 4 mL lidocaine (cardiac); 80 mg methylPREDNISolone acetate 80 MG/ML  Patient tolerance: patient tolerated the procedure well with no immediate complications       talked about

## 2020-06-22 RX ORDER — METHYLPREDNISOLONE ACETATE 80 MG/ML
80 INJECTION, SUSPENSION INTRA-ARTICULAR; INTRALESIONAL; INTRAMUSCULAR; SOFT TISSUE
Status: COMPLETED | OUTPATIENT
Start: 2020-06-19 | End: 2020-06-19

## 2020-10-29 ENCOUNTER — TELEPHONE (OUTPATIENT)
Dept: GASTROENTEROLOGY | Facility: CLINIC | Age: 35
End: 2020-10-29

## 2021-02-05 NOTE — TELEPHONE ENCOUNTER
----- Message from Sunshine Delatorre sent at 2020 12:30 PM EDT -----  Regarding: Refill  Contact: [phone]  Pt stated she was prescribed a hydrocortisone rectal cream 2.5% by Felicia rabago in 2018. She states she never got any of the refills that were given to her, and the bottle she has is .    She is set up for an appointment on , but is having problems and would like to know if it's possible to get another script for this cream until her next appointment.    
Called pt and advised of Dr Duarte's note. Pt verb understanding.   
Pt's request forwarded to Dr Duarte.   
Refill [id number] as scheduled  
normal...

## 2021-02-13 ENCOUNTER — HOSPITAL ENCOUNTER (EMERGENCY)
Facility: HOSPITAL | Age: 36
Discharge: HOME | End: 2021-02-14
Attending: EMERGENCY MEDICINE | Admitting: EMERGENCY MEDICINE

## 2021-02-13 ENCOUNTER — APPOINTMENT (OUTPATIENT)
Dept: CT IMAGING | Facility: HOSPITAL | Age: 36
End: 2021-02-13

## 2021-02-13 VITALS
HEART RATE: 80 BPM | HEIGHT: 68 IN | BODY MASS INDEX: 29.4 KG/M2 | RESPIRATION RATE: 16 BRPM | DIASTOLIC BLOOD PRESSURE: 61 MMHG | WEIGHT: 194 LBS | SYSTOLIC BLOOD PRESSURE: 118 MMHG | OXYGEN SATURATION: 100 % | TEMPERATURE: 98 F

## 2021-02-13 DIAGNOSIS — R51.9 ACUTE NONINTRACTABLE HEADACHE, UNSPECIFIED HEADACHE TYPE: Primary | ICD-10-CM

## 2021-02-13 DIAGNOSIS — R42 DYSEQUILIBRIUM: ICD-10-CM

## 2021-02-13 LAB
ANION GAP SERPL CALCULATED.3IONS-SCNC: 9.9 MMOL/L (ref 5–15)
BASOPHILS # BLD AUTO: 0.03 10*3/MM3 (ref 0–0.2)
BASOPHILS NFR BLD AUTO: 0.5 % (ref 0–1.5)
BUN SERPL-MCNC: 9 MG/DL (ref 6–20)
BUN/CREAT SERPL: 10.8 (ref 7–25)
CALCIUM SPEC-SCNC: 9.2 MG/DL (ref 8.6–10.5)
CHLORIDE SERPL-SCNC: 105 MMOL/L (ref 98–107)
CO2 SERPL-SCNC: 24.1 MMOL/L (ref 22–29)
CREAT SERPL-MCNC: 0.83 MG/DL (ref 0.57–1)
DEPRECATED RDW RBC AUTO: 47.9 FL (ref 37–54)
EOSINOPHIL # BLD AUTO: 0.05 10*3/MM3 (ref 0–0.4)
EOSINOPHIL NFR BLD AUTO: 0.8 % (ref 0.3–6.2)
ERYTHROCYTE [DISTWIDTH] IN BLOOD BY AUTOMATED COUNT: 17.5 % (ref 12.3–15.4)
GFR SERPL CREATININE-BSD FRML MDRD: 78 ML/MIN/1.73
GLUCOSE SERPL-MCNC: 109 MG/DL (ref 65–99)
HCG SERPL QL: NEGATIVE
HCT VFR BLD AUTO: 36.9 % (ref 34–46.6)
HGB BLD-MCNC: 11.1 G/DL (ref 12–15.9)
IMM GRANULOCYTES # BLD AUTO: 0.02 10*3/MM3 (ref 0–0.05)
IMM GRANULOCYTES NFR BLD AUTO: 0.3 % (ref 0–0.5)
LYMPHOCYTES # BLD AUTO: 2.35 10*3/MM3 (ref 0.7–3.1)
LYMPHOCYTES NFR BLD AUTO: 37.6 % (ref 19.6–45.3)
MCH RBC QN AUTO: 22.9 PG (ref 26.6–33)
MCHC RBC AUTO-ENTMCNC: 30.1 G/DL (ref 31.5–35.7)
MCV RBC AUTO: 76.1 FL (ref 79–97)
MONOCYTES # BLD AUTO: 0.74 10*3/MM3 (ref 0.1–0.9)
MONOCYTES NFR BLD AUTO: 11.8 % (ref 5–12)
NEUTROPHILS NFR BLD AUTO: 3.06 10*3/MM3 (ref 1.7–7)
NEUTROPHILS NFR BLD AUTO: 49 % (ref 42.7–76)
NRBC BLD AUTO-RTO: 0 /100 WBC (ref 0–0.2)
PLATELET # BLD AUTO: 234 10*3/MM3 (ref 140–450)
PMV BLD AUTO: 10.2 FL (ref 6–12)
POTASSIUM SERPL-SCNC: 3.7 MMOL/L (ref 3.5–5.2)
RBC # BLD AUTO: 4.85 10*6/MM3 (ref 3.77–5.28)
SODIUM SERPL-SCNC: 139 MMOL/L (ref 136–145)
WBC # BLD AUTO: 6.25 10*3/MM3 (ref 3.4–10.8)

## 2021-02-13 PROCEDURE — 84703 CHORIONIC GONADOTROPIN ASSAY: CPT | Performed by: EMERGENCY MEDICINE

## 2021-02-13 PROCEDURE — 96360 HYDRATION IV INFUSION INIT: CPT

## 2021-02-13 PROCEDURE — 99283 EMERGENCY DEPT VISIT LOW MDM: CPT

## 2021-02-13 PROCEDURE — 80048 BASIC METABOLIC PNL TOTAL CA: CPT | Performed by: EMERGENCY MEDICINE

## 2021-02-13 PROCEDURE — 0 IOPAMIDOL PER 1 ML: Performed by: EMERGENCY MEDICINE

## 2021-02-13 PROCEDURE — 85025 COMPLETE CBC W/AUTO DIFF WBC: CPT | Performed by: EMERGENCY MEDICINE

## 2021-02-13 PROCEDURE — 70496 CT ANGIOGRAPHY HEAD: CPT

## 2021-02-13 RX ORDER — SODIUM CHLORIDE 0.9 % (FLUSH) 0.9 %
10 SYRINGE (ML) INJECTION AS NEEDED
Status: DISCONTINUED | OUTPATIENT
Start: 2021-02-13 | End: 2021-02-14 | Stop reason: HOSPADM

## 2021-02-13 RX ADMIN — SODIUM CHLORIDE 1000 ML: 9 INJECTION, SOLUTION INTRAVENOUS at 22:05

## 2021-02-13 RX ADMIN — IOPAMIDOL 95 ML: 755 INJECTION, SOLUTION INTRAVENOUS at 23:18

## 2021-02-14 NOTE — ED NOTES
"Sent by urgent care for blood work.     C/o lightheaded/dizziness \"not like I'm going to pass out but that something is off.\" +pressure headache with no hx of same.    No fevers at home. +A&Ox4. Thought had ear infection a few weeks ago. No post nasal drip.     Pt wearing mask. This RN wearing appropriate PPE.     Raya Abdul, RN  02/13/21 2037    "

## 2021-02-14 NOTE — ED PROVIDER NOTES
EMERGENCY DEPARTMENT ENCOUNTER    Room Number:  38/38  Date seen:  2/14/2021  PCP: Viviane Morrow MD  Historian: Patient      HPI:  Chief Complaint: Dizziness, head pressure  A complete HPI/ROS/PMH/PSH/SH/FH are unobtainable due to: Nothing  Context: Jenna Jones is a 35 y.o. female who presents to the ED c/o mild dizziness and head pressure that has been ongoing for 2 days now.  Patient reports that she initially had some symptoms on Monday but those symptoms resolved for a couple of days and then recurred yesterday.  She denies room spinning sensation or lightheadedness, but she reports a mild sensation of being off.  She also reports mild head pressure but not pain.  She denies photophobia or phonophobia.  She has had no tinnitus, hearing loss.  She reports that she also had some mild ear pain on the right recently and she was concerned she may have an ear infection so she went to urgent care today.  She was sent here for further evaluation due to history of anemia.  Patient reports that she does have relatively heavy menstrual periods.  She is currently taking iron supplement.  She also has a history of GERD and gastric erosions.  She denies any recent black or bloody stool.  She denies fever or chills.  She did have COVID-19 in early January characterized by mild Covid pneumonia.  She reports that all Covid symptoms had resolved.    Her head pressure is mild, constant.  It is not worse with recumbent position.  She denies any visual disturbance.  She takes no oral contraceptive therapy.  She does not smoke.            PAST MEDICAL HISTORY  Active Ambulatory Problems     Diagnosis Date Noted   • Pain 08/16/2016   • Pain in both lower extremities 04/13/2017   • Early satiety 06/05/2018   • Nausea 06/05/2018   • Epigastric pain 06/05/2018   • Tear of lateral meniscus of right knee, current 10/21/2019     Resolved Ambulatory Problems     Diagnosis Date Noted   • No Resolved Ambulatory Problems     Past  Medical History:   Diagnosis Date   • Anemia    • Anxiety    • Arthritis    • Asthma    • Chronic constipation    • Depression    • Environmental and seasonal allergies    • Gastric erosion    • GERD (gastroesophageal reflux disease)    • History of anemia    • RAD (reactive airway disease)    • Right knee meniscal tear          PAST SURGICAL HISTORY  Past Surgical History:   Procedure Laterality Date   • CERVICAL CONIZATION     • COLONOSCOPY N/A 7/25/2018    A few erosions at 45 cm proximal to the anus   • ENDOSCOPY N/A 7/25/2018    Small hiatal hernia, gastritis   • KNEE ARTHROSCOPY      LEFT AND RIGHT   • KNEE ARTHROSCOPY Right 12/23/2019    Procedure: KNEE ARTHROSCOPY DERBRIDEMENT OF ARTHRITIS PARTIAL LATERAL MENISECTOMY;  Surgeon: Janis Stern MD;  Location: [facility] OR [facility] [place];  Service: Orthopedics   • LIPOMA EXCISION      ankle   • ROTATOR CUFF REPAIR Right          FAMILY HISTORY  Family History   Problem Relation Age of Onset   • Hypertension Other    • Diabetes Other    • Stomach cancer Other    • Prostate cancer Other    • Colon polyps Mother    • Green's esophagus Father    • Colon polyps Maternal Aunt    • Colon cancer Maternal Uncle    • Colon cancer Maternal Grandmother    • Colon polyps Maternal Grandmother    • Colon cancer Cousin    • Stomach cancer Paternal Grandfather    • No Known Problems Sister    • No Known Problems Brother    • No Known Problems Paternal Aunt    • No Known Problems Paternal Uncle    • No Known Problems Maternal Grandfather    • No Known Problems Paternal Grandmother    • Celiac disease Neg Hx    • Cirrhosis Neg Hx    • Crohn's disease Neg Hx    • Cystic fibrosis Neg Hx    • Esophageal cancer Neg Hx    • Hemochromatosis Neg Hx    • Inflammatory bowel disease Neg Hx    • Irritable bowel syndrome Neg Hx    • Liver cancer Neg Hx    • Liver disease Neg Hx    • Rectal cancer Neg Hx    • Ulcerative colitis Neg Hx    • Herber's disease Neg Hx    • Alcohol abuse Neg Hx    •  Pancreatitis Neg Hx    • Anesthesia problems Neg Hx    • Broken bones Neg Hx    • Cancer Neg Hx    • Clotting disorder Neg Hx    • Collagen disease Neg Hx    • Dislocations Neg Hx    • Osteoporosis Neg Hx    • Rheumatologic disease Neg Hx    • Scoliosis Neg Hx    • Severe sprains Neg Hx          SOCIAL HISTORY  Social History     Socioeconomic History   • Marital status:      Spouse name: Not on file   • Number of children: Not on file   • Years of education: Not on file   • Highest education level: Not on file   Tobacco Use   • Smoking status: Never Smoker   • Smokeless tobacco: Never Used   Substance and Sexual Activity   • Alcohol use: Yes     Comment: rare    • Drug use: No   • Sexual activity: Defer         ALLERGIES  Adhesive tape        REVIEW OF SYSTEMS  Review of Systems   Review of all 14 systems is negative other than stated in the HPI above.      PHYSICAL EXAM  ED Triage Vitals   Temp Heart Rate Resp BP SpO2   02/13/21 2039 02/13/21 2038 02/13/21 2038 02/13/21 2206 02/13/21 2038   98 °F (36.7 °C) 80 16 118/61 100 %      Temp src Heart Rate Source Patient Position BP Location FiO2 (%)   02/13/21 2039 -- -- -- --   Tympanic             GENERAL: Awake and alert, no acute distress  HENT: nares patent, TMs clear bilaterally  EYES: no scleral icterus, EOMI, pupils 3 mm reactive bilaterally, no nystagmus noted  CV: regular rhythm, normal rate  RESPIRATORY: normal effort, lungs clear to auscultation bilaterally  MUSCULOSKELETAL: no deformity  NEURO: alert, moves all extremities, follows commands.  There is no nystagmus noted.  Extraocular movements intact.  Finger-nose is normal in bilateral upper extremities.  Speech is fluent and clear.  Steady gait.  PSYCH:  calm, cooperative  SKIN: warm, dry    Vital signs and nursing notes reviewed.          LAB RESULTS  Recent Results (from the past 24 hour(s))   Basic Metabolic Panel    Collection Time: 02/13/21 10:03 PM    Specimen: Blood   Result Value Ref Range     Glucose 109 (H) 65 - 99 mg/dL    BUN 9 6 - 20 mg/dL    Creatinine 0.83 0.57 - 1.00 mg/dL    Sodium 139 136 - 145 mmol/L    Potassium 3.7 3.5 - 5.2 mmol/L    Chloride 105 98 - 107 mmol/L    CO2 24.1 22.0 - 29.0 mmol/L    Calcium 9.2 8.6 - 10.5 mg/dL    eGFR Non African Amer 78 >60 mL/min/1.73    BUN/Creatinine Ratio 10.8 7.0 - 25.0    Anion Gap 9.9 5.0 - 15.0 mmol/L   hCG, Serum, Qualitative    Collection Time: 02/13/21 10:03 PM    Specimen: Blood   Result Value Ref Range    HCG Qualitative Negative Negative   CBC Auto Differential    Collection Time: 02/13/21 10:03 PM    Specimen: Blood   Result Value Ref Range    WBC 6.25 3.40 - 10.80 10*3/mm3    RBC 4.85 3.77 - 5.28 10*6/mm3    Hemoglobin 11.1 (L) 12.0 - 15.9 g/dL    Hematocrit 36.9 34.0 - 46.6 %    MCV 76.1 (L) 79.0 - 97.0 fL    MCH 22.9 (L) 26.6 - 33.0 pg    MCHC 30.1 (L) 31.5 - 35.7 g/dL    RDW 17.5 (H) 12.3 - 15.4 %    RDW-SD 47.9 37.0 - 54.0 fl    MPV 10.2 6.0 - 12.0 fL    Platelets 234 140 - 450 10*3/mm3    Neutrophil % 49.0 42.7 - 76.0 %    Lymphocyte % 37.6 19.6 - 45.3 %    Monocyte % 11.8 5.0 - 12.0 %    Eosinophil % 0.8 0.3 - 6.2 %    Basophil % 0.5 0.0 - 1.5 %    Immature Grans % 0.3 0.0 - 0.5 %    Neutrophils, Absolute 3.06 1.70 - 7.00 10*3/mm3    Lymphocytes, Absolute 2.35 0.70 - 3.10 10*3/mm3    Monocytes, Absolute 0.74 0.10 - 0.90 10*3/mm3    Eosinophils, Absolute 0.05 0.00 - 0.40 10*3/mm3    Basophils, Absolute 0.03 0.00 - 0.20 10*3/mm3    Immature Grans, Absolute 0.02 0.00 - 0.05 10*3/mm3    nRBC 0.0 0.0 - 0.2 /100 WBC       Ordered the above labs and reviewed the results.        RADIOLOGY  Ct Angiogram Head    Addendum Date: 2/14/2021    ADDENDUM: Patient: LORI CLARKE  Time Out: 00:14 Exam(s): CTA HEAD, CTA HEAD  Added by Blaine Tejeda M.D. on 2 14 2021 12:14 AM (PST) (Correction for typographical error) EXAM:   CT Head With Intravenous Contrast CLINICAL HISTORY:    Reason for exam: mild headache, head pressure, CT VENOGRAM. TECHNIQUE:    Axial computed tomographic images of the head with intravenous contrast.   CTDI is 54.80 mGy and DLP is 1007.30 mGy-cm.  This CT exam was performed according to the principle of ALARA (As Low As Reasonably Achievable) by using one or more of the following dose reduction techniques: automated exposure control, adjustment of the mA and or kV according to patient size, and or use of iterative reconstruction technique. COMPARISON:   No relevant prior studies available. FINDINGS:   Right internal carotid artery:  No acute findings.  Intracranial segment is patent with no significant stenosis.  No aneurysm.   Right anterior cerebral artery:  Unremarkable.  No occlusion or significant stenosis.  No aneurysm.   Right middle cerebral artery:  Unremarkable.  No occlusion or significant stenosis.  No aneurysm.   Right posterior cerebral artery:  Unremarkable.  No occlusion or significant stenosis.  No aneurysm.   Right vertebral artery:  Unremarkable as visualized.   Left internal carotid artery:  No acute findings.  Intracranial segment is patent with no significant stenosis.  No aneurysm.   Left anterior cerebral artery:  Unremarkable.  No occlusion or significant stenosis.  No aneurysm.   Left middle cerebral artery:  Unremarkable.  No occlusion or significant stenosis.  No aneurysm.   Left posterior cerebral artery:  Unremarkable.  No occlusion or significant stenosis.  No aneurysm.   Left vertebral artery:  Unremarkable as visualized.   Basilar artery:  Unremarkable.  No occlusion or significant stenosis.  No aneurysm.   Other vasculature:  NO evidence of venous sinus thrombosis. IMPRESSION:     1.  NO evidence of venous sinus thrombosis. 2.  No arterial abnormality.     Addendum Date: 2/14/2021    ADDENDUM: 02 14 21 00:11 Call From [facility]  Dr. Song on 02 14 00:10 (-05:00)     Result Date: 2/14/2021  Patient: LORI CLARKE  Time Out: 00:02 Exam(s): CTA HEAD, CTA HEAD EXAM:   CT Head Without Intravenous  Contrast CLINICAL HISTORY:    Reason for exam: mild headache, head pressure, CT VENOGRAM. TECHNIQUE:   Axial computed tomography images of the head brain without intravenous contrast.  CTDI is 54.80 mGy and DLP is 1007.30 mGy-cm.  This CT exam was performed according to the principle of ALARA (As Low As Reasonably Achievable) by using one or more of the following dose reduction techniques: automated exposure control, adjustment of the mA and or kV according to patient size, and or use of iterative reconstruction technique. COMPARISON:   06 24 2014. FINDINGS:   Brain:  Unremarkable.  No hemorrhage.  No edema.   Ventricles:  Unremarkable.   Bones joints:  Unremarkable.  No acute fracture.   Soft tissues:  Unremarkable.   Sinuses:  Unremarkable as visualized.   Mastoid air cells:  Unremarkable as visualized. IMPRESSION:       No acute intracranial abnormality. _______________________________________________ EXAM:   CT Head With Intravenous Contrast CLINICAL HISTORY:    Reason for exam: mild headache, head pressure, CT VENOGRAM. TECHNIQUE:   Axial computed tomographic images of the head with intravenous contrast.   CTDI is 54.80 mGy and DLP is 1007.30 mGy-cm.  This CT exam was performed according to the principle of ALARA (As Low As Reasonably Achievable) by using one or more of the following dose reduction techniques: automated exposure control, adjustment of the mA and or kV according to patient size, and or use of iterative reconstruction technique. COMPARISON:   No relevant prior studies available. FINDINGS:   Right internal carotid artery:  No acute findings.  Intracranial segment is patent with no significant stenosis.  No aneurysm.   Right anterior cerebral artery:  Unremarkable.  No occlusion or significant stenosis.  No aneurysm.   Right middle cerebral artery:  Unremarkable.  No occlusion or significant stenosis.  No aneurysm.   Right posterior cerebral artery:  Unremarkable.  No occlusion or significant  stenosis.  No aneurysm.   Right vertebral artery:  Unremarkable as visualized.   Left internal carotid artery:  No acute findings.  Intracranial segment is patent with no significant stenosis.  No aneurysm.   Left anterior cerebral artery:  Unremarkable.  No occlusion or significant stenosis.  No aneurysm.   Left middle cerebral artery:  Unremarkable.  No occlusion or significant stenosis.  No aneurysm.   Left posterior cerebral artery:  Unremarkable.  No occlusion or significant stenosis.  No aneurysm.   Left vertebral artery:  Unremarkable as visualized.   Basilar artery:  Unremarkable.  No occlusion or significant stenosis.  No aneurysm.   Other vasculature:  Evidence of venous sinus thrombosis. IMPRESSION:     1.  Evidence of venous sinus thrombosis. 2.  No arterial abnormality.     Electronically signed by Blaine Tejeda M.D. on 02-14-21 at 0002      Ordered the above noted radiological studies. Reviewed by me in PACS.            PROCEDURES  Procedures            MEDICATIONS GIVEN IN ER  Medications   sodium chloride 0.9 % flush 10 mL (has no administration in time range)   sodium chloride 0.9 % bolus 1,000 mL (0 mL Intravenous Stopped 2/13/21 6810)   iopamidol (ISOVUE-370) 76 % injection 100 mL (95 mL Intravenous Given by Other 2/13/21 2892)                   MEDICAL DECISION MAKING, PROGRESS, and CONSULTS    All labs have been independently reviewed by me.  All radiology studies have been reviewed by me and discussed with radiologist dictating the report.   EKG's independently viewed and interpreted by me.  Discussion below represents my analysis of pertinent findings related to patient's condition, differential diagnosis, treatment plan and final disposition.    ED Course as of Feb 14 0015   Sat Feb 13, 2021   2323 Patient is complaining of mild head pressure and also a feeling of disequilibrium.  Differential diagnosis includes vertigo, hypovolemia, anemia, vertebrobasilar insufficiency.  I also considered  "possible dural venous thrombosis given possible hypercoagulable state in the setting of recent COVID-19 infection.  She does not have other risk factors.  I opted to obtain a CT venogram of the brain.    [JR]   2324 Labs reviewed and reassuring.  Hemoglobin 11.1 slightly improved compared to prior.    [JR]   [place] Feb 14, 2021   0013 CT report initially read evidence of dural venous thrombosis.  I called and spoke with Dr. Tejeda, the radiologist, who reports that this was a typographical error and it was in fact supposed to read \"no evidence of dural venous thrombosis\".  He will addend the report.    [JR]   0014 I discussed reassuring imaging findings with the patient and also return precautions, specifically for development of vision disturbances, as this would prompt consideration of intracranial hypertension.  Had not think that diagnostic lumbar puncture is warranted at this time based on current symptomatology and reassuring imaging studies.    [JR]      ED Course User Index  [JR] Edgar Song MD              I wore an N95 mask, face shield, and gloves during this patient encounter.  Patient also wearing a surgical mask.  Hand hygeine performed before and after seeing the patient.    DIAGNOSIS  Final diagnoses:   Acute nonintractable headache, unspecified headache type   Dysequilibrium         DISPOSITION  DISCHARGE    Patient discharged in stable condition.    Reviewed implications of results, diagnosis, meds, responsibility to follow up, warning signs and symptoms of possible worsening, potential complications and reasons to return to ER.    Patient/Family voiced understanding of above instructions.    Discussed plan for discharge, as there is no emergent indication for admission. Patient referred to primary care provider for BP management due to today's BP. Pt/family is agreeable and understands need for follow up and repeat testing.  Pt is aware that discharge does not mean that nothing is wrong but " it indicates no emergency is present that requires admission and they must continue care with follow-up as given below or physician of their choice.     FOLLOW-UP  Viviane Morrow MD  [id number] [address]  [phone]    Schedule an appointment as soon as possible for a visit            Medication List      No changes were made to your prescriptions during this visit.                   Latest Documented Vital Signs:  As of 00:15 EST  BP- 118/61 HR- 80 Temp- 98 °F (36.7 °C) (Tympanic) O2 sat- 100%        --    Please note that portions of this were completed with a voice recognition program.          Edgar Song MD  02/14/21 0016

## 2021-02-21 ENCOUNTER — HOSPITAL ENCOUNTER (EMERGENCY)
Facility: HOSPITAL | Age: 36
Discharge: HOME | End: 2021-02-21
Attending: EMERGENCY MEDICINE | Admitting: EMERGENCY MEDICINE

## 2021-02-21 ENCOUNTER — APPOINTMENT (OUTPATIENT)
Dept: GENERAL RADIOLOGY | Facility: HOSPITAL | Age: 36
End: 2021-02-21

## 2021-02-21 VITALS
HEART RATE: 59 BPM | OXYGEN SATURATION: 100 % | WEIGHT: 194 LBS | RESPIRATION RATE: 16 BRPM | HEIGHT: 68 IN | DIASTOLIC BLOOD PRESSURE: 73 MMHG | SYSTOLIC BLOOD PRESSURE: 111 MMHG | TEMPERATURE: 99.3 F | BODY MASS INDEX: 29.4 KG/M2

## 2021-02-21 DIAGNOSIS — M62.838 TRAPEZIUS MUSCLE SPASM: ICD-10-CM

## 2021-02-21 DIAGNOSIS — M54.2 ACUTE NECK PAIN: Primary | ICD-10-CM

## 2021-02-21 PROCEDURE — 72050 X-RAY EXAM NECK SPINE 4/5VWS: CPT

## 2021-02-21 PROCEDURE — 99283 EMERGENCY DEPT VISIT LOW MDM: CPT

## 2021-02-21 RX ORDER — ACETAMINOPHEN 500 MG
1000 TABLET ORAL ONCE
Status: COMPLETED | OUTPATIENT
Start: 2021-02-21 | End: 2021-02-21

## 2021-02-21 RX ORDER — ACETAMINOPHEN 500 MG
1000 TABLET ORAL EVERY 8 HOURS PRN
Qty: 30 TABLET | Refills: 0 | Status: SHIPPED | OUTPATIENT
Start: 2021-02-21

## 2021-02-21 RX ORDER — LIDOCAINE 50 MG/G
1 PATCH TOPICAL EVERY 24 HOURS
Qty: 30 PATCH | Refills: 0 | Status: SHIPPED | OUTPATIENT
Start: 2021-02-21 | End: 2021-03-22

## 2021-02-21 RX ORDER — CYCLOBENZAPRINE HCL 5 MG
5 TABLET ORAL 3 TIMES DAILY PRN
Qty: 15 TABLET | Refills: 0 | Status: SHIPPED | OUTPATIENT
Start: 2021-02-21 | End: 2021-12-20

## 2021-02-21 RX ORDER — LIDOCAINE 50 MG/G
1 PATCH TOPICAL ONCE
Status: DISCONTINUED | OUTPATIENT
Start: 2021-02-21 | End: 2021-02-21 | Stop reason: HOSPADM

## 2021-02-21 RX ADMIN — ACETAMINOPHEN 1000 MG: 500 TABLET ORAL at 17:44

## 2021-02-21 RX ADMIN — LIDOCAINE 1 PATCH: 50 PATCH TOPICAL at 17:44

## 2021-02-21 NOTE — ED TRIAGE NOTES
Patient presents to er via private vehicle from home.  Patient is reporting right sided occipital headache with pressure radiating to ear that started intermittently since the 13th and worse over the past two days.  Patient was placed in face mask during first look triage.  Patient was wearing a face mask throughout encounter.  I wore personal protective equipment throughout the encounter.  Hand hygiene was performed before and after patient encounter.

## 2021-02-21 NOTE — ED PROVIDER NOTES
EMERGENCY DEPARTMENT ENCOUNTER  Patient was placed in face mask in first look and the following protective measures were taken unless additional measures were taken and documented below in the ED course. Patient was wearing facemask when I entered the room and throughout our encounter. I wore full protective equipment throughout this patient encounter including a face mask, and gloves. Hand hygiene was performed before donning protective equipment and after removal when leaving the room.    Room Number:  22/22  Date of encounter:  2/21/2021  PCP: Viviane Morrow MD    HPI:  Context: Jenna Jones is a 36 y.o. female who presents to the ED c/o chief complaint of neck pain.  Patient complains of right-sided neck pain since the end of January.  Patient denies any falls or trauma, no strain or inciting event.  Pain is constant, described as dull/pressure.  Pain does not radiate.  Patient does report she has had intermittent right ear pain, denies any hearing loss, no fullness in her ear, no sinus issues, no cough or upper respiratory symptoms.  Patient denies any vertigo, no difficulty with balance or coordination, no double vision.  Triage reported headache although patient states she has only had mild occasional headache, she states headache is only when her neck is bothering her and she feels some pressure in the right side of her head but this is only occasional and extremely mild.  No visual disturbances, no sensitivity to light or sound, no nausea vomiting.  Patient denies any fever shakes chills or night sweats, no rashes.  Patient denies any radicular pain, no weakness or numbness.  Patient does report that she has had some right anterior shoulder pain but denies any difficulty moving it.  Patient was seen by her orthopedist and received x-rays of her shoulder that were unremarkable, received cortisone injection without relief.    MEDICAL HISTORY REVIEW  Reviewed in EPIC    PAST MEDICAL HISTORY  Active  Ambulatory Problems     Diagnosis Date Noted   • Pain 08/16/2016   • Pain in both lower extremities 04/13/2017   • Early satiety 06/05/2018   • Nausea 06/05/2018   • Epigastric pain 06/05/2018   • Tear of lateral meniscus of right knee, current 10/21/2019     Resolved Ambulatory Problems     Diagnosis Date Noted   • No Resolved Ambulatory Problems     Past Medical History:   Diagnosis Date   • Anemia    • Anxiety    • Arthritis    • Asthma    • Chronic constipation    • Depression    • Environmental and seasonal allergies    • Gastric erosion    • GERD (gastroesophageal reflux disease)    • History of anemia    • RAD (reactive airway disease)    • Right knee meniscal tear        PAST SURGICAL HISTORY  Past Surgical History:   Procedure Laterality Date   • CERVICAL CONIZATION     • COLONOSCOPY N/A 7/25/2018    A few erosions at 45 cm proximal to the anus   • ENDOSCOPY N/A 7/25/2018    Small hiatal hernia, gastritis   • KNEE ARTHROSCOPY      LEFT AND RIGHT   • KNEE ARTHROSCOPY Right 12/23/2019    Procedure: KNEE ARTHROSCOPY DERBRIDEMENT OF ARTHRITIS PARTIAL LATERAL MENISECTOMY;  Surgeon: Janis Stern MD;  Location: [facility] [place];  Service: Orthopedics   • LIPOMA EXCISION      ankle   • ROTATOR CUFF REPAIR Right        FAMILY HISTORY  Family History   Problem Relation Age of Onset   • Hypertension Other    • Diabetes Other    • Stomach cancer Other    • Prostate cancer Other    • Colon polyps Mother    • Green's esophagus Father    • Colon polyps Maternal Aunt    • Colon cancer Maternal Uncle    • Colon cancer Maternal Grandmother    • Colon polyps Maternal Grandmother    • Colon cancer Cousin    • Stomach cancer Paternal Grandfather    • No Known Problems Sister    • No Known Problems Brother    • No Known Problems Paternal Aunt    • No Known Problems Paternal Uncle    • No Known Problems Maternal Grandfather    • No Known Problems Paternal Grandmother    • Celiac disease Neg Hx    • Cirrhosis Neg Hx    •  Crohn's disease Neg Hx    • Cystic fibrosis Neg Hx    • Esophageal cancer Neg Hx    • Hemochromatosis Neg Hx    • Inflammatory bowel disease Neg Hx    • Irritable bowel syndrome Neg Hx    • Liver cancer Neg Hx    • Liver disease Neg Hx    • Rectal cancer Neg Hx    • Ulcerative colitis Neg Hx    • Herber's disease Neg Hx    • Alcohol abuse Neg Hx    • Pancreatitis Neg Hx    • Anesthesia problems Neg Hx    • Broken bones Neg Hx    • Cancer Neg Hx    • Clotting disorder Neg Hx    • Collagen disease Neg Hx    • Dislocations Neg Hx    • Osteoporosis Neg Hx    • Rheumatologic disease Neg Hx    • Scoliosis Neg Hx    • Severe sprains Neg Hx        SOCIAL HISTORY  Social History     Socioeconomic History   • Marital status:      Spouse name: Not on file   • Number of children: Not on file   • Years of education: Not on file   • Highest education level: Not on file   Tobacco Use   • Smoking status: Never Smoker   • Smokeless tobacco: Never Used   Substance and Sexual Activity   • Alcohol use: Yes     Comment: rare    • Drug use: No   • Sexual activity: Defer       ALLERGIES  Adhesive tape    The patient's allergies have been reviewed    REVIEW OF SYSTEMS  All systems reviewed and negative except for those discussed in HPI.     PHYSICAL EXAM  I have reviewed the triage vital signs and nursing notes.  ED Triage Vitals   Temp Heart Rate Resp BP SpO2   02/21/21 1557 02/21/21 1557 02/21/21 1557 02/21/21 1629 02/21/21 1557   99.3 °F (37.4 °C) 77 16 140/80 95 %      Temp src Heart Rate Source Patient Position BP Location FiO2 (%)   02/21/21 1557 02/21/21 1557 -- -- --   Tympanic Monitor          General: No acute distress  HENT: NCAT, PERRL, Nares patent  Eyes: no scleral icterus  Neck: trachea midline, no ROM limitations.  Cervical spine: No step-offs or deformities, no midline tenderness, patient does have right paraspinal tenderness as well as tenderness with trigger points in her right upper trapezius.  CV: regular  rhythm, regular rate  Respiratory: normal effort, CTAB  Abdomen: soft, nondistended, nontender to palpation, no rebound tenderness, no guarding or rigidity  : deferred  Musculoskeletal: no deformity  Neuro: alert, moves all extremities, follows commands.  Bilateral upper extremities: 5 out of 5 strength at shoulder flexion/extension, shoulder abduction/abduction, elbow flexion/extension, wrist flexion/extension,  strength.  Sensation intact to light touch throughout.  Skin: warm, dry    LAB RESULTS  No results found for this or any previous visit (from the past 24 hour(s)).    I ordered the above labs and reviewed the results.    RADIOLOGY  Xr Spine Cervical Complete 4 Or 5 View    Result Date: 2/21/2021  Patient: LORI CLARKE  Time Out: 18:01 Exam(s): FILM C SPINE EXAM:   XR Cervical Spine, 4 or 5 Views CLINICAL HISTORY:    Reason for exam: Atraumatic right-sided neck pain. TECHNIQUE:   Frontal, lateral and oblique views of the cervical spine. COMPARISON:   None FINDINGS:   Bones:  No vertebral body height loss to suggest acute fracture. No subluxation.  Straightening of the normal cervical lordosis.   Soft tissues: No acute finding. IMPRESSION:     No acute abnormality.     Electronically signed by Sean Enamorado M.D. on 02-21-21 at 1801      I ordered the above noted radiological studies. I reviewed the images and results. I agree with the radiologist interpretation.    PROCEDURES  Procedures    MEDICATIONS GIVEN IN ER  Medications   lidocaine (LIDODERM) 5 % 1 patch (1 patch Transdermal Medication Applied 2/21/21 1744)   acetaminophen (TYLENOL) tablet 1,000 mg (1,000 mg Oral Given 2/21/21 1744)       PROGRESS, DATA ANALYSIS, CONSULTS, AND MEDICAL DECISION MAKING  A complete history and physical exam have been performed.  All available laboratory and imaging results have been reviewed by myself prior to disposition.    MDM  After the initial H&P, I discussed pertinent information from history and  physical exam with patient/family.  Discussed differential diagnosis.  Discussed plan for ED evaluation/work-up/treatment.  All questions answered.  Patient/family is agreeable with plan.  ED Course as of Feb 21 1829   Sun Feb 21, 2021 1705 Medical history reviewed and significant for: Patient was last seen in the emergency department on the 13th of this month.  At that time patient complained of head pressure for the last 2 days.  Patient underwent CT imaging of the head as well as CT venogram.  CT imaging was unremarkable, no signs of venous sinus thrombosis.    [JG]   1827 The patient was reexamined.  They have had symptomatic improvement during their ED stay.  I discussed today's findings with the patient, explaining the pertinent positives and negatives from today's visit, and the plan of care.  Discussed plan for discharge as there is no emergent indication for admission.  Discussed limitation of the ED work-up and that this is to rule out life-threatening emergencies but that they could require further testing as determined by their primary care and or any referred specialist patient is agreeable and understands need for follow-up and repeat exam/testing.  Patient is aware that discharge does not mean there is nothing wrong, indicates no emergency is present, and that they must continue their care with their primary care physician and/or any referred specialist.  They were given appropriate follow-up with their primary care physician and/or specialist.  I had an extensive discussion on the expected clinical course and return precautions.  Patient understands to return to the emergency department for continuation, worsening, or new symptoms.  I answered any of the patient's questions. Patient was discharged home in a stable condition.        [JG]      ED Course User Index  [JG] Jaime Horton MD       AS OF 18:29 EST VITALS:    BP - 140/80  HR - 77  TEMP - 99.3 °F (37.4 °C) (Tympanic)  O2 SATS -  95%    DIAGNOSIS  Final diagnoses:   Acute neck pain   Trapezius muscle spasm         DISPOSITION  DISCHARGE    Patient discharged in stable condition.    Reviewed implications of results, diagnosis, meds, responsibility to follow up, warning signs and symptoms of possible worsening, potential complications and reasons to return to ER.    Patient/Family voiced understanding of above instructions.    Discussed plan for discharge, as there is no emergent indication for admission. Patient referred to primary care provider for BP management due to today's BP. Pt/family is agreeable and understands need for follow up and repeat testing.  Pt is aware that discharge does not mean that nothing is wrong but it indicates no emergency is present that requires admission and they must continue care with follow-up as given below or physician of their choice.     FOLLOW-UP  Viviane Morrow MD  [address] [address]  [phone]    Schedule an appointment as soon as possible for a visit in 2 days  even if well    Prateek Khan MD  [address] [id number] [phone]    Schedule an appointment as soon as possible for a visit   as needed         Medication List      New Prescriptions    acetaminophen 500 MG tablet  Commonly known as: TYLENOL  Take 2 tablets by mouth Every 8 (Eight) Hours As Needed for Mild Pain .     cyclobenzaprine 5 MG tablet  Commonly known as: FLEXERIL  Take 1 tablet by mouth 3 (Three) Times a Day As Needed for Muscle Spasms.     lidocaine 5 %  Commonly known as: Lidoderm  Place 1 patch on the skin as directed by provider Daily. Remove & Discard patch within 12 hours or as directed by MD           Where to Get Your Medications      These medications were sent to [facility] [address] [place] [address] [facility] AT [facility] [address] - [phone]  - [phone]   [address] [facility] [address]    Phone: [phone]    · acetaminophen 500 MG tablet  · cyclobenzaprine 5 MG tablet  · lidocaine 5 %          Jaime Horton MD  02/21/21 [id number]

## 2021-03-22 ENCOUNTER — OFFICE VISIT (OUTPATIENT)
Dept: GASTROENTEROLOGY | Facility: CLINIC | Age: 36
End: 2021-03-22

## 2021-03-22 VITALS — BODY MASS INDEX: 30.16 KG/M2 | TEMPERATURE: 98.6 F | WEIGHT: 199 LBS | HEIGHT: 68 IN

## 2021-03-22 DIAGNOSIS — Z87.19 HISTORY OF ACUTE GASTRITIS: ICD-10-CM

## 2021-03-22 DIAGNOSIS — K62.5 RECTAL BLEEDING: ICD-10-CM

## 2021-03-22 DIAGNOSIS — K59.00 CONSTIPATION, UNSPECIFIED CONSTIPATION TYPE: ICD-10-CM

## 2021-03-22 DIAGNOSIS — K21.9 GASTROESOPHAGEAL REFLUX DISEASE WITHOUT ESOPHAGITIS: Primary | ICD-10-CM

## 2021-03-22 PROCEDURE — 99214 OFFICE O/P EST MOD 30 MIN: CPT | Performed by: NURSE PRACTITIONER

## 2021-03-22 RX ORDER — FAMOTIDINE 20 MG/1
20 TABLET, FILM COATED ORAL 2 TIMES DAILY
Qty: 180 TABLET | Refills: 3 | Status: SHIPPED | OUTPATIENT
Start: 2021-03-22 | End: 2021-11-17

## 2021-03-22 RX ORDER — FERROUS SULFATE 325(65) MG
325 TABLET ORAL
COMMUNITY

## 2021-03-22 RX ORDER — POLYETHYLENE GLYCOL 3350 17 G/17G
17 POWDER, FOR SOLUTION ORAL DAILY
Qty: 30 PACKET | Refills: 5 | Status: SHIPPED | OUTPATIENT
Start: 2021-03-22 | End: 2021-12-20

## 2021-03-22 NOTE — PROGRESS NOTES
Chief Complaint   Patient presents with   • Heartburn   • Rectal Bleeding       Jenna Jones is a  36 y.o. female here for a follow up visit for GERD.    HPI  36-year-old female presents today for follow-up visit for GERD.  She is a patient of Dr. Duarte.  She was last seen in the office by me on 2/20/2019.  She has a history of GERD/gastritis and admits she was taking Protonix 40 mg daily and she just really did not want to be taking a proton pump inhibitor after reading the potential side effects and long-term risk.  So she took her self off of it.  Since then she has been having breakthrough reflux that she tells me she is getting worse and worse.  She is worried about esophageal cancer because her dad has had it.  So she realizes she needs to treat her reflux but she is just not sure what to take.  She did have an EGD and colonoscopy done in 7/2018.  At that time she was found to have a small hiatal hernia and gastritis.  Colonoscopy showed a few erosions at the 45 cm proximal to the anus at that time.  Patient does admit she has constipation issues.  She does not have a bowel movement regularly.  She will use MiraLAX over-the-counter every once in a while.  But she admits she does not take anything regularly.  She does not take any fiber or probiotics.  Lately she has been having episodes of rectal bleeding when she wipes.  It is not all the time and it might be once a week once a month.  She tells me she has to strain a lot and the constipation to seems to be getting worse.  She denies any dysphagia, abdominal pain, nausea and vomiting, diarrhea, or melena.  She is appetite is good and her weight has gone up 5 pounds since February of this year.  Past Medical History:   Diagnosis Date   • Anemia    • Anxiety    • Arthritis     MICHA KNEES   • Asthma     CHILDHOOD   • Chronic constipation    • Depression    • Environmental and seasonal allergies    • Gastric erosion     HIST OF   • GERD (gastroesophageal  reflux disease)    • History of anemia     IRON DEF   • RAD (reactive airway disease)    • Right knee meniscal tear        Past Surgical History:   Procedure Laterality Date   • CERVICAL CONIZATION     • COLONOSCOPY N/A 7/25/2018    A few erosions at 45 cm proximal to the anus   • ENDOSCOPY N/A 7/25/2018    Small hiatal hernia, gastritis   • KNEE ARTHROSCOPY      LEFT AND RIGHT   • KNEE ARTHROSCOPY Right 12/23/2019    Procedure: KNEE ARTHROSCOPY DERBRIDEMENT OF ARTHRITIS PARTIAL LATERAL MENISECTOMY;  Surgeon: Janis Stern MD;  [place] [facility] [place] [facility] [place]  Service: [place]opedics   • LIPOMA EXCISION      ankle   • ROTATOR CUFF REPAIR Right        Scheduled Meds:    Continuous Infusions:No current facility-administered medications for this visit.      PRN Meds:.    Allergies   Allergen Reactions   • Adhesive Tape Other (See Comments)     TEARS SKIN       Social History     Socioeconomic History   • Marital status:      Spouse name: Not on file   • Number of children: Not on file   • Years of education: Not on file   • Highest education level: Not on file   Tobacco Use   • Smoking status: Never Smoker   • Smokeless tobacco: Never Used   Substance and Sexual Activity   • Alcohol use: Yes     Comment: rare    • Drug use: No   • Sexual activity: Defer       Family History   Problem Relation Age of Onset   • Hypertension Other    • Diabetes Other    • Stomach cancer Other    • Prostate cancer Other    • Colon polyps Mother    • Green's esophagus Father    • Colon polyps Maternal Aunt    • Colon cancer Maternal Uncle    • Colon cancer Maternal Grandmother    • Colon polyps Maternal Grandmother    • Colon cancer Cousin    • Stomach cancer Paternal Grandfather    • No Known Problems Sister    • No Known Problems Brother    • No Known Problems Paternal Aunt    • No Known Problems Paternal Uncle    • No Known Problems Maternal Grandfather    • No Known Problems Paternal Grandmother    • Celiac disease Neg Hx    •  Cirrhosis Neg Hx    • Crohn's disease Neg Hx    • Cystic fibrosis Neg Hx    • Esophageal cancer Neg Hx    • Hemochromatosis Neg Hx    • Inflammatory bowel disease Neg Hx    • Irritable bowel syndrome Neg Hx    • Liver cancer Neg Hx    • Liver disease Neg Hx    • Rectal cancer Neg Hx    • Ulcerative colitis Neg Hx    • Herber's disease Neg Hx    • Alcohol abuse Neg Hx    • Pancreatitis Neg Hx    • Anesthesia problems Neg Hx    • Broken bones Neg Hx    • Cancer Neg Hx    • Clotting disorder Neg Hx    • Collagen disease Neg Hx    • Dislocations Neg Hx    • Osteoporosis Neg Hx    • Rheumatologic disease Neg Hx    • Scoliosis Neg Hx    • Severe sprains Neg Hx        Review of Systems   Constitutional: Negative for appetite change, chills, diaphoresis, fatigue, fever and unexpected weight change.   HENT: Negative for nosebleeds, postnasal drip, sore throat, trouble swallowing and voice change.    Respiratory: Negative for cough, choking, chest tightness, shortness of breath and wheezing.    Cardiovascular: Negative for chest pain, palpitations and leg swelling.   Gastrointestinal: Positive for anal bleeding and constipation. Negative for abdominal distention, abdominal pain, blood in stool, diarrhea, nausea, rectal pain and vomiting.   Endocrine: Negative for polydipsia, polyphagia and polyuria.   Musculoskeletal: Negative for gait problem.   Skin: Negative for rash and wound.   Allergic/Immunologic: Negative for food allergies.   Neurological: Negative for dizziness, speech difficulty and light-headedness.   Psychiatric/Behavioral: Negative for confusion, self-injury, sleep disturbance and suicidal ideas.       Vitals:    03/22/21 1519   Temp: 98.6 °F (37 °C)       Physical Exam  Constitutional:       General: She is not in acute distress.     Appearance: She is well-developed. She is not ill-appearing.   HENT:      Head: Normocephalic.   Eyes:      Pupils: Pupils are equal, round, and reactive to light.    Cardiovascular:      Rate and Rhythm: Normal rate and regular rhythm.      Heart sounds: Normal heart sounds.   Pulmonary:      Effort: Pulmonary effort is normal.      Breath sounds: Normal breath sounds.   Abdominal:      General: Bowel sounds are normal. There is no distension.      Palpations: Abdomen is soft. There is no mass.      Tenderness: There is no abdominal tenderness. There is no guarding or rebound.      Hernia: No hernia is present.      Comments: Rectal exam revealed very erythematous and irritated skin along midline above the rectum no active bleeding noted.   Musculoskeletal:         General: Normal range of motion.   Skin:     General: Skin is warm and dry.   Neurological:      Mental Status: She is alert and oriented to person, place, and time.   Psychiatric:         Speech: Speech normal.         Behavior: Behavior normal.         Judgment: Judgment normal.         No radiology results for the last 7 days     Assessment and plan     1. Gastroesophageal reflux disease without esophagitis    2. History of acute gastritis    3. Rectal bleeding    4. Constipation, unspecified constipation type    Reviewed results of EGD and colonoscopy with her today.  GERD is not well controlled at this time.  We had a long discussion about the pros and cons of using PPIs long-term.  At this point I think it is only fair to try the Pepcid 20 mg twice daily and see how she does.  Patient to continue to work on her diet and weight loss.  Continue GERD precautions.  Constipation is not well controlled.  I believe the constipation is what is causing her intermittent rectal bleeding episodes.  Patient to get back on daily MiraLAX.  Increase water as tolerated.  Patient to try using Vaseline or a good barrier cream along the skin above her anal opening that appears to be very red and inflamed on her rectal exam.  This area is what could be bleeding off and on with her straining so much.  Colonoscopy was reassuring in  2018.  Patient to call the office next week with an update.  Patient to follow-up with me in 3 months.  Patient is agreeable to the plan.

## 2021-06-23 ENCOUNTER — OFFICE VISIT (OUTPATIENT)
Dept: GASTROENTEROLOGY | Facility: CLINIC | Age: 36
End: 2021-06-23

## 2021-06-23 VITALS
OXYGEN SATURATION: 99 % | DIASTOLIC BLOOD PRESSURE: 78 MMHG | HEART RATE: 65 BPM | WEIGHT: 204 LBS | BODY MASS INDEX: 30.92 KG/M2 | TEMPERATURE: 97.4 F | HEIGHT: 68 IN | SYSTOLIC BLOOD PRESSURE: 126 MMHG

## 2021-06-23 DIAGNOSIS — R10.13 EPIGASTRIC PAIN: ICD-10-CM

## 2021-06-23 DIAGNOSIS — K21.9 GASTROESOPHAGEAL REFLUX DISEASE WITHOUT ESOPHAGITIS: Primary | ICD-10-CM

## 2021-06-23 DIAGNOSIS — K59.00 CONSTIPATION, UNSPECIFIED CONSTIPATION TYPE: ICD-10-CM

## 2021-06-23 DIAGNOSIS — D50.9 IRON DEFICIENCY ANEMIA, UNSPECIFIED IRON DEFICIENCY ANEMIA TYPE: ICD-10-CM

## 2021-06-23 DIAGNOSIS — K76.0 FATTY LIVER: ICD-10-CM

## 2021-06-23 PROCEDURE — 99214 OFFICE O/P EST MOD 30 MIN: CPT | Performed by: NURSE PRACTITIONER

## 2021-06-23 NOTE — PROGRESS NOTES
Chief Complaint   Patient presents with   • Constipation     f/u       Jenna Jones is a  36 y.o. female here for a follow up visit for constipation.    HPI  86-year-old female presents today for follow-up visit for constipation.  She is a patient of Dr. Duarte.  She was last seen in the office by me on 03/22/2021.  She has a history of constipation and admits she has been doing really well with MiraLAX every day.  She does me as long as she remembers to take it every day her bowels are moving much better.  She also has a history of GERD/gastritis and admits lately she has been having worsening reflux symptoms.  She has been taking Pepcid 20 mg twice a day and she just does not feel like it is working anymore.  Her last EGD and colonoscopy was on 07/25/2018.  He also has a history of fatty liver disease and admits her LFTs have been normal.  She also has a history of iron deficiency anemia and takes iron supplementation daily.  Her most recent hemoglobin was stable.  She is supposed to follow-up with hematology as planned.  Lately though she tells me she is been having worsening reflux symptoms including epigastric pain.  She denies any dysphagia, nausea and vomiting, diarrhea, rectal bleeding or melena.  She admits her appetite is okay and her weight is stable.  She has had gastric erosions in the past.  Past Medical History:   Diagnosis Date   • Anemia    • Anxiety    • Arthritis     MICHA KNEES   • Asthma     CHILDHOOD   • Chronic constipation    • Depression    • Environmental and seasonal allergies    • Gastric erosion     HIST OF   • GERD (gastroesophageal reflux disease)    • History of anemia     IRON DEF   • RAD (reactive airway disease)    • Right knee meniscal tear        Past Surgical History:   Procedure Laterality Date   • CERVICAL CONIZATION     • COLONOSCOPY N/A 7/25/2018    A few erosions at 45 cm proximal to the anus   • ENDOSCOPY N/A 7/25/2018    Small hiatal hernia, gastritis   • KNEE  ARTHROSCOPY      LEFT AND RIGHT   • KNEE ARTHROSCOPY Right 12/23/2019    Procedure: KNEE ARTHROSCOPY DERBRIDEMENT OF ARTHRITIS PARTIAL LATERAL MENISECTOMY;  Surgeon: Janis Stern MD;  [facility];  Service: Orthopedics   • LIPOMA EXCISION      ankle   • ROTATOR CUFF REPAIR Right        Scheduled Meds:    Continuous Infusions:No current facility-administered medications for this visit.      PRN Meds:.    Allergies   Allergen Reactions   • Adhesive Tape Other (See Comments)     TEARS SKIN       Social History     Socioeconomic History   • Marital status:      Spouse name: Not on file   • Number of children: Not on file   • Years of education: Not on file   • Highest education level: Not on file   Tobacco Use   • Smoking status: Never Smoker   • Smokeless tobacco: Never Used   Substance and Sexual Activity   • Alcohol use: Yes     Comment: rare    • Drug use: No   • Sexual activity: Defer       Family History   Problem Relation Age of Onset   • Hypertension Other    • Diabetes Other    • Stomach cancer Other    • Prostate cancer Other    • Colon polyps Mother    • Green's esophagus Father    • Colon polyps Maternal Aunt    • Colon cancer Maternal Uncle    • Colon cancer Maternal Grandmother    • Colon polyps Maternal Grandmother    • Colon cancer Cousin    • Stomach cancer Paternal Grandfather    • No Known Problems Sister    • No Known Problems Brother    • No Known Problems Paternal Aunt    • No Known Problems Paternal Uncle    • No Known Problems Maternal Grandfather    • No Known Problems Paternal Grandmother    • Celiac disease Neg Hx    • Cirrhosis Neg Hx    • Crohn's disease Neg Hx    • Cystic fibrosis Neg Hx    • Esophageal cancer Neg Hx    • Hemochromatosis Neg Hx    • Inflammatory bowel disease Neg Hx    • Irritable bowel syndrome Neg Hx    • Liver cancer Neg Hx    • Liver disease Neg Hx    • Rectal cancer Neg Hx    • Ulcerative colitis Neg Hx    • Herber's disease Neg Hx    •  Alcohol abuse Neg Hx    • Pancreatitis Neg Hx    • Anesthesia problems Neg Hx    • Broken bones Neg Hx    • Cancer Neg Hx    • Clotting disorder Neg Hx    • Collagen disease Neg Hx    • Dislocations Neg Hx    • Osteoporosis Neg Hx    • Rheumatologic disease Neg Hx    • Scoliosis Neg Hx    • Severe sprains Neg Hx        Review of Systems   Constitutional: Negative for appetite change, chills, diaphoresis, fatigue, fever and unexpected weight change.   HENT: Negative for nosebleeds, postnasal drip, sore throat, trouble swallowing and voice change.    Respiratory: Negative for cough, choking, chest tightness, shortness of breath and wheezing.    Cardiovascular: Negative for chest pain, palpitations and leg swelling.   Gastrointestinal: Positive for abdominal distention. Negative for abdominal pain, anal bleeding, blood in stool, constipation, diarrhea, nausea, rectal pain and vomiting.   Endocrine: Negative for polydipsia, polyphagia and polyuria.   Musculoskeletal: Negative for gait problem.   Skin: Negative for rash and wound.   Allergic/Immunologic: Negative for food allergies.   Neurological: Negative for dizziness, speech difficulty and light-headedness.   Psychiatric/Behavioral: Negative for confusion, self-injury, sleep disturbance and suicidal ideas.       Vitals:    06/23/21 0900   BP: 126/78   Pulse: 65   Temp: 97.4 °F (36.3 °C)   SpO2: 99%       Physical Exam  Constitutional:       General: She is not in acute distress.     Appearance: She is well-developed. She is not ill-appearing.   HENT:      Head: Normocephalic.   Eyes:      Pupils: Pupils are equal, round, and reactive to light.   Cardiovascular:      Rate and Rhythm: Normal rate and regular rhythm.      Heart sounds: Normal heart sounds.   Pulmonary:      Effort: Pulmonary effort is normal.      Breath sounds: Normal breath sounds.   Abdominal:      General: Bowel sounds are normal. There is no distension.      Palpations: Abdomen is soft. There is no  mass.      Tenderness: There is no abdominal tenderness. There is no guarding or rebound.      Hernia: No hernia is present.   Musculoskeletal:         General: Normal range of motion.   Skin:     General: Skin is warm and dry.   Neurological:      Mental Status: She is alert and oriented to person, place, and time.   Psychiatric:         Speech: Speech normal.         Behavior: Behavior normal.         Judgment: Judgment normal.         No radiology results for the last 7 days     Assessment and plan     1. Gastroesophageal reflux disease without esophagitis    2. Epigastric pain    3. Constipation, unspecified constipation type    4. Fatty liver    5. Iron deficiency anemia, unspecified iron deficiency anemia type      GERD does not sound well controlled at this time.  We will go ahead and get her on Protonix and Carafate and see how she does.  Continue GERD precautions.  Constipation seems well controlled on MiraLAX as long as she remembers to take it.  Most recent LFTs were normal.  Hemoglobin is stable.  Continue daily iron supplementation.  Patient to follow-up with hematology as planned.  Patient to call the office next week with an update.  Patient to follow-up with me in 1 month.  Patient is agreeable to the plan.

## 2021-06-24 ENCOUNTER — TELEPHONE (OUTPATIENT)
Dept: GASTROENTEROLOGY | Facility: CLINIC | Age: 36
End: 2021-06-24

## 2021-06-24 NOTE — TELEPHONE ENCOUNTER
----- Message from Jenna Jones sent at 6/23/2021  4:36 PM EDT -----  Regarding: Referral Request  Contact: [phone]  [facility] group is requesting a referral be sent over before I can schedule an appointment. Could you send that over to them? Thank you!

## 2021-07-15 ENCOUNTER — TELEPHONE (OUTPATIENT)
Dept: GASTROENTEROLOGY | Facility: CLINIC | Age: 36
End: 2021-07-15

## 2021-07-15 NOTE — TELEPHONE ENCOUNTER
Called pt and call can not be completed at this time.      Referral has been placed . [facility] number is [phone].

## 2021-07-15 NOTE — TELEPHONE ENCOUNTER
----- Message from DERRELL Ku sent at 7/15/2021 12:55 PM EDT -----  Regarding: FW: REFERRAL  Contact: [phone]  See referrals looks like Callie put it in on June 28.  ----- Message -----  From: Daphne Holbrook RN  Sent: 7/15/2021  10:32 AM EDT  To: DERRELL Ku  Subject: FW: REFERRAL                                       ----- Message -----  From: Jamin Burns RegSched Rep  Sent: 7/15/2021   9:47 AM EDT  To: [facility] [address] [place]  Subject: REFERRAL                                         Hello,    The messages are being sent to Emilia on behalf of Callie Palmer. PT stating that Callie DOVE advised that she would send over a referral for PT to see Hematologist. She is wanting to know if the referral has been submitted. Can we look into this please? Thank you.

## 2021-07-16 NOTE — TELEPHONE ENCOUNTER
Call to pt.  Advise referral to [facility] has been placed.  May contact that office.  Verb understanding.

## 2021-08-16 RX ORDER — PANTOPRAZOLE SODIUM 40 MG/1
TABLET, DELAYED RELEASE ORAL
Qty: 180 TABLET | Refills: 1 | Status: SHIPPED | OUTPATIENT
Start: 2021-08-16 | End: 2021-11-17

## 2021-08-19 ENCOUNTER — TELEPHONE (OUTPATIENT)
Dept: GASTROENTEROLOGY | Facility: CLINIC | Age: 36
End: 2021-08-19

## 2021-08-19 NOTE — TELEPHONE ENCOUNTER
----- Message from Sunshine Bain Rep sent at 8/19/2021  9:08 AM EDT -----  Regarding: prescription refill  Contact: [phone]  Spoke with pt she stated that she needs a refill on her pantoprazol wants to know how long that will take and can someone give her a call

## 2021-08-19 NOTE — TELEPHONE ENCOUNTER
Patient ID: Sandhya Wheatley is a 55 year old female.     Chief Complaint   Patient presents with   • Annual Exam    tired a lot lately  Does not sleep good at night    HPI     Patient is here for multiple medical problems and medications management  No exercise  Tired a lot for past few months  Does not sleep good at night  Few sinus infections in past year  Congested for past couple of weeks  Cough, clear  Sometimes when she blows her nose it comes out green  Ex smoker, quit within five years, was smoking one pack a day for long period of time        Review of Systems   All other systems reviewed and are negative.      ALLERGIES:  Fish   (food or med) and Shellfish allergy   (food or med)    Patient Active Problem List   Diagnosis   • GERD (gastroesophageal reflux disease)   • Postablative hypothyroidism   • Acute conjunctivitis of both eyes   • Tired   • Other insomnia   • Hypertriglyceridemia   • Routine medical exam   • Cough   • Ex-smoker   • Encounter for screening mammogram for malignant neoplasm of breast       Past Medical History:   Diagnosis Date   • Thyroid disease        Past Surgical History:   Procedure Laterality Date   • Colposcopy,loop electrd cervix excis     • Tubal ligation         Family History   Problem Relation Age of Onset   • Cancer Father         lung   • Motor Vehicle Accident Mother    • Patient is unaware of any medical problems Sister    • Patient is unaware of any medical problems Brother    • Cancer, Breast Maternal Aunt    • Motor Vehicle Accident Sister    • Patient is unaware of any medical problems Brother    • Cancer, Colon Neg Hx    • Cancer, Ovarian Neg Hx        Social History     Tobacco Use   • Smoking status: Former Smoker     Packs/day: 0.00     Quit date:      Years since quittin.6   • Smokeless tobacco: Never Used   Substance Use Topics   • Alcohol use: No   • Drug use: No       Immunization History   Administered Date(s) Administered   • COVID-19  Pt's message forwarded to Callie FONTENOT.    [employer] 05/14/2021, 06/04/2021   • Influenza, injectable, quadrivalent, preservative-free 09/28/2018, 10/27/2020        Current Outpatient Medications   Medication Sig   • levothyroxine 75 MCG tablet TAKE 1 TABLET BY MOUTH DAILY   • Multiple Vitamins-Minerals (vitamin - therapeutic multivitamins w/minerals) tablet      No current facility-administered medications for this visit.        Vitals:    08/19/21 1322   BP: 101/70   BP Location: LUE - Left upper extremity   Patient Position: Sitting   Cuff Size: Large Adult   Pulse: 66   Resp: 16   Temp: 98.6 °F (37 °C)   TempSrc: Oral   SpO2: 99%   Weight: 76 kg (167 lb 7 oz)   Height: 5' 3.58\" (1.615 m)   PainSc:  0        Physical Exam  Constitutional:       Appearance: Normal appearance.   Cardiovascular:      Rate and Rhythm: Normal rate and regular rhythm.      Heart sounds: Normal heart sounds.   Pulmonary:      Breath sounds: Normal breath sounds.   Abdominal:      General: Bowel sounds are normal.   Neurological:      Mental Status: She is alert.         Assessment   Diagnoses and associated orders for this visit:  1. Routine medical exam  -     Iron And total Iron Binding Capacity  -     Vitamin B12 And Folate  2. Postablative hypothyroidism  Assessment & Plan:  Continue levothyroxine   Monitor TSH  3. Tired  Assessment & Plan:  Check iron and tibc  Check vitamin B12 and folic acid  Orders:  -     Iron And total Iron Binding Capacity  -     Vitamin B12 And Folate  4. Other insomnia  Assessment & Plan:  Sleep habits discussed  Melatonin  Monitor    5. Hypertriglyceridemia  Assessment & Plan:  Discussed  Check lipids at next blood draw  Diet  Exercise    6. Cough  Assessment & Plan:  CXR offered does not want to do it  Recommended CTChest for lung cancer screening , she does not want to do it  7. Ex-smoker  8. Encounter for screening mammogram for malignant neoplasm of breast  Assessment & Plan:  Mammogram    Orders:  -     MAMMO SCREENING BILATERAL W DEMETRICE        Orders Placed This Encounter   • MAMMO SCREENING BILATERAL W DEMETRICE   • Iron And total Iron Binding Capacity   • Vitamin B12 And Folate        Increase dietary efforts  Exercise 3-5 times a week, 30 minutes each time or 150 minutes each week .  Reviewed all Medications, side effects, indications and interactions with patient at length.  Lab results discussed with patient.  Test results , differential diagnosis treatment options discussed with patient.  Mechanism of underlying disese process discussed with patient.  Patient voiced good understanding  of these instructions. All questions answered.  Educational materials for above diagnosis provided to patient.  Smoking cessation counseling and treatment options provided for patient.  Alcohol cessation counseling and treatment options provided for patient  Patient instructed to call in 5-7days if no better with current treatment.    Time spent obtaining,reviewing record and history, mediations lists, exam, reviewing tests,counseling and educating patient/family/caregiver,ordering medications, tests, documenting clinical information    Follow up: Return if symptoms worsen or fail to improve.    Discussed medication dosage, usage, goals of therapy, and side effects.    The patient indicates understanding of these issues and agrees with the plan.    Electronically signed by:  Lizet Campbell MD

## 2021-08-19 NOTE — TELEPHONE ENCOUNTER
[facility] and spoke with pharmacist who advised that they do have the pt's prescription but they are short of help and they have hundreds of prescriptions that they need to fill.  She states to give them a couple of hours and they will try to get it ready.     Called pt and on  advised of the above. Advised to call with questions.

## 2021-08-20 ENCOUNTER — APPOINTMENT (OUTPATIENT)
Dept: VACCINE CLINIC | Facility: HOSPITAL | Age: 36
End: 2021-08-20

## 2021-09-22 ENCOUNTER — OFFICE VISIT (OUTPATIENT)
Dept: GASTROENTEROLOGY | Facility: CLINIC | Age: 36
End: 2021-09-22

## 2021-09-22 ENCOUNTER — TELEPHONE (OUTPATIENT)
Dept: GASTROENTEROLOGY | Facility: CLINIC | Age: 36
End: 2021-09-22

## 2021-09-22 VITALS
OXYGEN SATURATION: 99 % | HEART RATE: 66 BPM | RESPIRATION RATE: 16 BRPM | DIASTOLIC BLOOD PRESSURE: 78 MMHG | WEIGHT: 207 LBS | HEIGHT: 68 IN | BODY MASS INDEX: 31.37 KG/M2 | SYSTOLIC BLOOD PRESSURE: 116 MMHG

## 2021-09-22 DIAGNOSIS — R11.0 NAUSEA: ICD-10-CM

## 2021-09-22 DIAGNOSIS — K58.0 IRRITABLE BOWEL SYNDROME WITH DIARRHEA: ICD-10-CM

## 2021-09-22 DIAGNOSIS — R10.13 DYSPEPSIA: Primary | ICD-10-CM

## 2021-09-22 DIAGNOSIS — R10.11 RUQ PAIN: ICD-10-CM

## 2021-09-22 DIAGNOSIS — K21.9 GASTROESOPHAGEAL REFLUX DISEASE WITHOUT ESOPHAGITIS: ICD-10-CM

## 2021-09-22 DIAGNOSIS — Z87.19 HISTORY OF ACUTE GASTRITIS: ICD-10-CM

## 2021-09-22 PROBLEM — K58.2 IRRITABLE BOWEL SYNDROME WITH BOTH CONSTIPATION AND DIARRHEA: Status: ACTIVE | Noted: 2021-09-22

## 2021-09-22 PROCEDURE — 99214 OFFICE O/P EST MOD 30 MIN: CPT | Performed by: PHYSICIAN ASSISTANT

## 2021-09-22 RX ORDER — TERBINAFINE HYDROCHLORIDE 250 MG/1
TABLET ORAL
COMMUNITY
Start: 2021-08-16 | End: 2021-12-20

## 2021-09-22 RX ORDER — BUSPIRONE HYDROCHLORIDE 10 MG/1
TABLET ORAL
COMMUNITY
Start: 2021-08-26 | End: 2021-12-20

## 2021-09-22 RX ORDER — CLINDAMYCIN PHOSPHATE 11.9 MG/ML
SOLUTION TOPICAL 2 TIMES DAILY
COMMUNITY
Start: 2021-08-04

## 2021-09-22 RX ORDER — ASPIRIN 81 MG/1
81 TABLET, CHEWABLE ORAL DAILY
COMMUNITY

## 2021-09-22 NOTE — PROGRESS NOTES
Chief Complaint  Heartburn    Subjective          History of Present Illness    Jenna Jones is a  36 y.o. female presents for follow-up on GERD/gastritis.  She is a patient of Dr. Duarte.  She was last seen in office by Callie on 6/23/2021.  She is new to me.    She has history of GERD/gastritis.  At last visit she was having worsening reflux symptoms with taking Pepcid 20 mg twice daily.  Callie changed her to Protonix 40 mg daily and Carafate.  She has had to gradually increase her Protonix to 40 mg twice daily due to persistence of symptoms.  This does seem to help but incompletely.  She has not taken the Carafate because she did not understand what it did.  She continues to report right upper quadrant pain radiating to the back, nausea, heartburn, burning mid to right-sided chest pain.  Symptoms are intermittent and worse with p.o. intake.  She does follow GERD diet in avoids red sauces, spicy food, greasy food.  She has stopped drinking coffee.  She does not take NSAIDs other than a baby aspirin.    She also reports a chronic history of alternating bowel habits.  She will swing between diarrhea, normal, and constipation.  She will take MiraLAX for constipation on occasion.  She has not determined if there are certain foods that cause the symptoms.  She is scheduled to see a functional medicine office in November for further work-up on this and other hormonal issues.  She does have persistent rectal bleeding intermittently on the toilet paper when she wipes.  She has intermittent anal itching at times that is related to the blood. This has been ongoing since last CN in 2018 and is unchanged.     She has history of iron deficiency anemia and takes iron supplementation daily. She was referred to hematology at last office visit.  6/16/2021 CBC showed hemoglobin of 10.8, microcytic/hypochromic.  This was decreased from 11.5 on May 21.    She has history of fatty liver disease.  6/16/2021 liver functions on  "care anywhere through [facility] were normal.    Her last EGD and colonoscopy was on 07/25/2018.  EGD showed small hiatal hernia. Gastritis.  Normal duodenum.  Colonoscopy showed a few erosions at 45 cm proximal to the anus.  Pathology was benign.    Objective   Vital Signs:   /78 (BP Location: Left arm, Patient Position: Sitting, Cuff Size: Large Adult)   Pulse 66   Resp 16   Ht 172 cm (67.72\")   Wt 93.9 kg (207 lb)   SpO2 99%   BMI 31.74 kg/m²       Physical Exam  Vitals reviewed.   Constitutional:       General: She is awake. She is not in acute distress.     Appearance: Normal appearance. She is well-developed and well-groomed.   HENT:      Head: Normocephalic and atraumatic.      Mouth/Throat:      Mouth: Mucous membranes are moist.   Cardiovascular:      Rate and Rhythm: Normal rate and regular rhythm.      Heart sounds: Normal heart sounds.   Pulmonary:      Effort: Pulmonary effort is normal.      Breath sounds: Normal breath sounds.   Abdominal:      General: Abdomen is flat. Bowel sounds are normal. There is no distension.      Palpations: Abdomen is soft. There is no mass.      Tenderness: There is generalized abdominal tenderness. There is no guarding or rebound.   Skin:     General: Skin is warm and dry.   Neurological:      Mental Status: She is alert and oriented to person, place, and time.      Gait: Gait normal.   Psychiatric:         Mood and Affect: Affect normal.         Speech: Speech normal.         Behavior: Behavior is cooperative.         Judgment: Judgment normal.        Result Review :           Assessment and Plan    Diagnoses and all orders for this visit:    1. Dyspepsia (Primary)  -     US Gallbladder  -     Case Request; Standing  -     Implement Anesthesia orders day of procedure.; Standing  -     Obtain informed consent; Standing  -     Case Request    2. Nausea  -     US Gallbladder  -     Case Request; Standing  -     Implement Anesthesia orders day of procedure.; " Standing  -     Obtain informed consent; Standing  -     Case Request    3. RUQ pain  -     US Gallbladder    4. Irritable bowel syndrome with diarrhea  -     US Gallbladder    5. Gastroesophageal reflux disease without esophagitis    6. History of acute gastritis    Other orders  -     riFAXIMin (XIFAXAN) 550 MG tablet; Take 1 tablet by mouth Every 8 (Eight) Hours.  Dispense: 42 tablet; Refill: 2    In regards to her upper abdominal symptoms, recommend she continue PPI twice daily for now.  We discussed the purpose of Carafate and she will take it at least twice a day before meals.  She knows not to take it with other medications.  Also recommend checking ultrasound of the gallbladder given right upper quadrant pain and nausea.  This is negative and symptoms persist, recommend HIDA scan.  I ordered EGD at patient request as she has hit her deductible this year and would like to have it done this year if needed.  If her ultrasound and HIDA scan are negative and her symptoms persist she will likely need upper endoscopy.  This will be canceled if it is not needed.    In regards to chronic alternating bowel habits, we discussed that this is likely IBS.  She prefers not to take medications unless she needs to but obviously her symptoms can be bothersome enough to require at times.  Recommend trial of Xifaxan if insurance approves to see if this improves IBS symptoms enough that she can avoid taking other medications.  She will also follow-up with the functional medicine specialist she had testing for food allergies and likely SIBO testing to rule out other sources.      Follow Up   Return in about 4 weeks (around 10/20/2021) for Alma Delia Spence AM, or Dr. Duarte.    Helio dictation used throughout this note.     Alma Delia Marlow PA-C

## 2021-09-24 ENCOUNTER — TELEPHONE (OUTPATIENT)
Dept: GASTROENTEROLOGY | Facility: CLINIC | Age: 36
End: 2021-09-24

## 2021-10-06 ENCOUNTER — HOSPITAL ENCOUNTER (OUTPATIENT)
Dept: ULTRASOUND IMAGING | Facility: HOSPITAL | Age: 36
Discharge: HOME | End: 2021-10-06
Admitting: PHYSICIAN ASSISTANT

## 2021-10-06 PROCEDURE — 76705 ECHO EXAM OF ABDOMEN: CPT

## 2021-10-11 ENCOUNTER — TELEPHONE (OUTPATIENT)
Dept: GASTROENTEROLOGY | Facility: CLINIC | Age: 36
End: 2021-10-11

## 2021-10-11 DIAGNOSIS — R10.11 RUQ PAIN: Primary | ICD-10-CM

## 2021-10-11 DIAGNOSIS — R11.0 NAUSEA: ICD-10-CM

## 2021-10-11 NOTE — TELEPHONE ENCOUNTER
Called pt and advised of Alma Delia's note. Verb understanding and would like to proceed with joseline.   Advised someone from [facility] will call her to arrange.  Verb understanding.

## 2021-10-11 NOTE — TELEPHONE ENCOUNTER
----- Message from Alma Delia Marlow PA-C sent at 10/11/2021 12:53 PM EDT -----  Please let patient know that her ultrasound showed no abnormalities in her gallbladder.  She does have a cyst in her liver which is benign and nothing to be concerned about.  This would not be causing her pain and are common incidental findings.  I put an order for a HIDA scan so as long as she is still symptomatic lets plan to get her scheduled for this.

## 2021-11-17 ENCOUNTER — OFFICE VISIT (OUTPATIENT)
Dept: GASTROENTEROLOGY | Facility: CLINIC | Age: 36
End: 2021-11-17

## 2021-11-17 VITALS — BODY MASS INDEX: 32.28 KG/M2 | WEIGHT: 213 LBS | TEMPERATURE: 97.9 F | HEIGHT: 68 IN

## 2021-11-17 DIAGNOSIS — R10.11 RUQ PAIN: Primary | ICD-10-CM

## 2021-11-17 DIAGNOSIS — R11.0 NAUSEA: ICD-10-CM

## 2021-11-17 DIAGNOSIS — R10.13 DYSPEPSIA: ICD-10-CM

## 2021-11-17 DIAGNOSIS — K58.0 IRRITABLE BOWEL SYNDROME WITH DIARRHEA: ICD-10-CM

## 2021-11-17 DIAGNOSIS — D50.9 IRON DEFICIENCY ANEMIA, UNSPECIFIED IRON DEFICIENCY ANEMIA TYPE: ICD-10-CM

## 2021-11-17 DIAGNOSIS — K76.0 FATTY LIVER: ICD-10-CM

## 2021-11-17 DIAGNOSIS — K21.9 GASTROESOPHAGEAL REFLUX DISEASE WITHOUT ESOPHAGITIS: ICD-10-CM

## 2021-11-17 PROCEDURE — 99214 OFFICE O/P EST MOD 30 MIN: CPT | Performed by: PHYSICIAN ASSISTANT

## 2021-11-17 RX ORDER — OMEPRAZOLE 40 MG/1
40 CAPSULE, DELAYED RELEASE ORAL 2 TIMES DAILY
Qty: 180 CAPSULE | Refills: 1 | Status: SHIPPED | OUTPATIENT
Start: 2021-11-17 | End: 2021-12-20

## 2021-11-17 NOTE — PROGRESS NOTES
"Chief Complaint  No chief complaint on file.    Subjective          History of Present Illness    Jenna Jones is a  36 y.o. female presents for follow-up on GERD/gastritis.  She is a patient of Dr. Duarte.  She was last seen by myself on 9/22/2021.    She is currently on 40 mg Protonix twice daily and Carafate. She reports couple flares recently that lasted for several days each. HIDA scan and EGD scheduled for December. She has tried pepcid without relief in past. She has failed several OTC meds including alkaseltzer, antacids, baking soda/water.     She also has history of alternating bowel habits.  Xifaxan prescription was sent at last office visit. She hasn't started yet as she cannot member what it was for.  She did see the functional medicine doctor yesterday and lab panel was drawn.    10/6/2021 ultrasound showed no abnormalities in her gallbladder.  Left hepatic lobe cyst measuring 2.3 cm, benign appearing.    She has history of iron deficiency anemia and takes iron supplementation daily. She was referred to hematology at last office visit.  6/16/2021 CBC showed hemoglobin of 10.8, microcytic/hypochromic.  This was decreased from 11.5 on May 21.     She has history of fatty liver disease.  6/16/2021 liver functions on care anywhere through [facility] were normal.     Her last EGD and colonoscopy was on 07/25/2018.  EGD showed small hiatal hernia. Gastritis.  Normal duodenum.  Colonoscopy showed a few erosions at 45 cm proximal to the anus.  Pathology was benign.    Objective   Vital Signs:   Temp 97.9 °F (36.6 °C)   Ht 172.7 cm (68\")   Wt 96.6 kg (213 lb)   BMI 32.39 kg/m²       Physical Exam  Vitals reviewed.   Constitutional:       General: She is awake. She is not in acute distress.     Appearance: Normal appearance. She is well-developed and well-groomed.   HENT:      Head: Normocephalic and atraumatic.   Pulmonary:      Effort: Pulmonary effort is normal. No respiratory distress.   Skin:     " Coloration: Skin is not pale.   Neurological:      Mental Status: She is alert and oriented to person, place, and time.      Gait: Gait normal.   Psychiatric:         Mood and Affect: Mood and affect normal.         Speech: Speech normal.         Behavior: Behavior is cooperative.         Judgment: Judgment normal.          Result Review :             Assessment and Plan    Diagnoses and all orders for this visit:    1. RUQ pain (Primary)    2. Nausea    3. Dyspepsia    4. Irritable bowel syndrome with diarrhea    5. Gastroesophageal reflux disease without esophagitis    6. Fatty liver    7. Iron deficiency anemia, unspecified iron deficiency anemia type    Other orders  -     omeprazole (priLOSEC) 40 MG capsule; Take 1 capsule by mouth 2 (Two) Times a Day.  Dispense: 180 capsule; Refill: 1    Recommend change in PPI to Omeprazole 40mg BID.     HIDA scan has been ordered--scheduled in Dec  EGD has been ordered and scheduled for 12/21/2021. She will need duodenal biopsies for celiac disease.     Recommend she start Xifaxan for her alternating bowel habits. This may also help her upper GI symptoms if related to SIBO.     Follow Up   Return in about 6 weeks (around 12/29/2021) for Dr. Duarte or Alma Delia.    Helio dictation used throughout this note.     Alma Delia Marlow PA-C

## 2021-12-13 ENCOUNTER — TRANSCRIBE ORDERS (OUTPATIENT)
Dept: GASTROENTEROLOGY | Facility: CLINIC | Age: 36
End: 2021-12-13

## 2021-12-13 DIAGNOSIS — Z01.818 OTHER SPECIFIED PRE-OPERATIVE EXAMINATION: Primary | ICD-10-CM

## 2021-12-14 ENCOUNTER — TELEPHONE (OUTPATIENT)
Dept: GASTROENTEROLOGY | Facility: CLINIC | Age: 36
End: 2021-12-14

## 2021-12-14 DIAGNOSIS — Z01.818 PRE-OP TESTING: Primary | ICD-10-CM

## 2021-12-14 NOTE — TELEPHONE ENCOUNTER
----- Message from Nj Galvan sent at 12/14/2021 11:37 AM EST -----  Regarding: COVID Test  Good Morning,    Patient need COVID order for upcoming procedure.    Thank you!

## 2021-12-18 ENCOUNTER — LAB (OUTPATIENT)
Dept: LAB | Facility: HOSPITAL | Age: 36
End: 2021-12-18

## 2021-12-18 DIAGNOSIS — Z01.818 OTHER SPECIFIED PRE-OPERATIVE EXAMINATION: ICD-10-CM

## 2021-12-18 LAB — SARS-COV-2 ORF1AB RESP QL NAA+PROBE: NOT DETECTED

## 2021-12-18 PROCEDURE — U0004 COV-19 TEST NON-CDC HGH THRU: HCPCS

## 2021-12-18 PROCEDURE — C9803 HOPD COVID-19 SPEC COLLECT: HCPCS

## 2021-12-20 ENCOUNTER — HOSPITAL ENCOUNTER (OUTPATIENT)
Dept: NUCLEAR MEDICINE | Facility: HOSPITAL | Age: 36
Discharge: HOME | End: 2021-12-20

## 2021-12-20 DIAGNOSIS — R10.11 RUQ PAIN: ICD-10-CM

## 2021-12-20 DIAGNOSIS — R11.0 NAUSEA: ICD-10-CM

## 2021-12-20 PROCEDURE — 0 TECHNETIUM TC 99M MEBROFENIN KIT: Performed by: PHYSICIAN ASSISTANT

## 2021-12-20 PROCEDURE — 78226 HEPATOBILIARY SYSTEM IMAGING: CPT

## 2021-12-20 PROCEDURE — A9537 TC99M MEBROFENIN: HCPCS | Performed by: PHYSICIAN ASSISTANT

## 2021-12-20 RX ORDER — KIT FOR THE PREPARATION OF TECHNETIUM TC 99M MEBROFENIN 45 MG/10ML
1 INJECTION, POWDER, LYOPHILIZED, FOR SOLUTION INTRAVENOUS
Status: COMPLETED | OUTPATIENT
Start: 2021-12-20 | End: 2021-12-20

## 2021-12-20 RX ORDER — PSEUDOEPHEDRINE HCL 120 MG
1 TABLET, EXTENDED RELEASE ORAL DAILY
COMMUNITY

## 2021-12-20 RX ORDER — PANTOPRAZOLE SODIUM 40 MG/1
40 TABLET, DELAYED RELEASE ORAL DAILY
COMMUNITY
End: 2022-08-16 | Stop reason: SDUPTHER

## 2021-12-20 RX ORDER — ALBUTEROL SULFATE 90 UG/1
2 AEROSOL, METERED RESPIRATORY (INHALATION) EVERY 4 HOURS PRN
COMMUNITY
Start: 2021-10-13

## 2021-12-20 RX ADMIN — MEBROFENIN 1 DOSE: 45 INJECTION, POWDER, LYOPHILIZED, FOR SOLUTION INTRAVENOUS at 08:00

## 2021-12-21 ENCOUNTER — HOSPITAL ENCOUNTER (OUTPATIENT)
Facility: HOSPITAL | Age: 36
Setting detail: HOSPITAL OUTPATIENT SURGERY
Discharge: HOME | End: 2021-12-21
Attending: INTERNAL MEDICINE | Admitting: INTERNAL MEDICINE

## 2021-12-21 ENCOUNTER — ANESTHESIA (OUTPATIENT)
Dept: GASTROENTEROLOGY | Facility: HOSPITAL | Age: 36
End: 2021-12-21

## 2021-12-21 ENCOUNTER — ANESTHESIA EVENT (OUTPATIENT)
Dept: GASTROENTEROLOGY | Facility: HOSPITAL | Age: 36
End: 2021-12-21

## 2021-12-21 VITALS
DIASTOLIC BLOOD PRESSURE: 63 MMHG | WEIGHT: 211.4 LBS | BODY MASS INDEX: 31.31 KG/M2 | OXYGEN SATURATION: 2 % | HEART RATE: 64 BPM | HEIGHT: 69 IN | SYSTOLIC BLOOD PRESSURE: 118 MMHG | RESPIRATION RATE: 20 BRPM

## 2021-12-21 DIAGNOSIS — R11.0 NAUSEA: ICD-10-CM

## 2021-12-21 DIAGNOSIS — R10.13 DYSPEPSIA: ICD-10-CM

## 2021-12-21 LAB
B-HCG UR QL: NEGATIVE
EXPIRATION DATE: NORMAL
INTERNAL NEGATIVE CONTROL: NEGATIVE
INTERNAL POSITIVE CONTROL: POSITIVE
Lab: NORMAL

## 2021-12-21 PROCEDURE — 81025 URINE PREGNANCY TEST: CPT | Performed by: INTERNAL MEDICINE

## 2021-12-21 PROCEDURE — S0260 H&P FOR SURGERY: HCPCS | Performed by: INTERNAL MEDICINE

## 2021-12-21 PROCEDURE — 25010000002 PROPOFOL 10 MG/ML EMULSION: Performed by: ANESTHESIOLOGY

## 2021-12-21 PROCEDURE — 88305 TISSUE EXAM BY PATHOLOGIST: CPT | Performed by: INTERNAL MEDICINE

## 2021-12-21 PROCEDURE — 43239 EGD BIOPSY SINGLE/MULTIPLE: CPT | Performed by: INTERNAL MEDICINE

## 2021-12-21 RX ORDER — PROPOFOL 10 MG/ML
VIAL (ML) INTRAVENOUS CONTINUOUS PRN
Status: DISCONTINUED | OUTPATIENT
Start: 2021-12-21 | End: 2021-12-21 | Stop reason: SURG

## 2021-12-21 RX ORDER — GLYCOPYRROLATE 0.2 MG/ML
INJECTION INTRAMUSCULAR; INTRAVENOUS AS NEEDED
Status: DISCONTINUED | OUTPATIENT
Start: 2021-12-21 | End: 2021-12-21 | Stop reason: SURG

## 2021-12-21 RX ORDER — PROPOFOL 10 MG/ML
VIAL (ML) INTRAVENOUS AS NEEDED
Status: DISCONTINUED | OUTPATIENT
Start: 2021-12-21 | End: 2021-12-21 | Stop reason: SURG

## 2021-12-21 RX ORDER — SODIUM CHLORIDE, SODIUM LACTATE, POTASSIUM CHLORIDE, CALCIUM CHLORIDE 600; 310; 30; 20 MG/100ML; MG/100ML; MG/100ML; MG/100ML
1000 INJECTION, SOLUTION INTRAVENOUS CONTINUOUS
Status: DISCONTINUED | OUTPATIENT
Start: 2021-12-21 | End: 2021-12-21 | Stop reason: HOSPADM

## 2021-12-21 RX ORDER — LIDOCAINE HYDROCHLORIDE 20 MG/ML
INJECTION, SOLUTION INFILTRATION; PERINEURAL AS NEEDED
Status: DISCONTINUED | OUTPATIENT
Start: 2021-12-21 | End: 2021-12-21 | Stop reason: SURG

## 2021-12-21 RX ADMIN — PROPOFOL 50 MG: 10 INJECTION, EMULSION INTRAVENOUS at 09:09

## 2021-12-21 RX ADMIN — LIDOCAINE HYDROCHLORIDE 80 MG: 20 INJECTION, SOLUTION INFILTRATION; PERINEURAL at 09:08

## 2021-12-21 RX ADMIN — PROPOFOL 100 MG: 10 INJECTION, EMULSION INTRAVENOUS at 09:08

## 2021-12-21 RX ADMIN — SODIUM CHLORIDE, POTASSIUM CHLORIDE, SODIUM LACTATE AND CALCIUM CHLORIDE 1000 ML: 600; 310; 30; 20 INJECTION, SOLUTION INTRAVENOUS at 08:54

## 2021-12-21 RX ADMIN — Medication 200 MCG/KG/MIN: at 09:08

## 2021-12-21 RX ADMIN — GLYCOPYRROLATE 0.2 MG: 0.2 INJECTION INTRAMUSCULAR; INTRAVENOUS at 09:07

## 2021-12-21 NOTE — ANESTHESIA PREPROCEDURE EVALUATION
Anesthesia Evaluation     Patient summary reviewed and Nursing notes reviewed   no history of anesthetic complications:  NPO Solid Status: > 8 hours  NPO Liquid Status: > 8 hours           Airway   Mallampati: II  Dental      Pulmonary - normal exam   (+) asthma,  Cardiovascular - negative cardio ROS and normal exam        Neuro/Psych  (+) psychiatric history Anxiety and Depression,     GI/Hepatic/Renal/Endo    (+)  GERD, PUD,  liver disease fatty liver disease,     Musculoskeletal     Abdominal    Substance History      OB/GYN          Other   arthritis,                      Anesthesia Plan    ASA 2     MAC     intravenous induction     Anesthetic plan, all risks, benefits, and alternatives have been provided, discussed and informed consent has been obtained with: patient.

## 2021-12-21 NOTE — ANESTHESIA POSTPROCEDURE EVALUATION
"Patient: Jenna Jones    Procedure Summary     Date: 12/21/21 Room / Location:  [facility] /  [facility]    Anesthesia Start: 0857 Anesthesia Stop: 0922    Procedure: ESOPHAGOGASTRODUODENOSCOPY with biopsies (N/A Esophagus) Diagnosis:       Dyspepsia      Nausea      (Dyspepsia [R10.13])      (Nausea [R11.0])    Surgeons: Donna Duarte MD Provider: Buck Kerr MD    Anesthesia Type: MAC ASA Status: 2          Anesthesia Type: MAC    Vitals  Vitals Value Taken Time   /63 12/21/21 0945   Temp     Pulse 64 12/21/21 0945   Resp 20 12/21/21 0945   SpO2 2 % 12/21/21 0945           Post Anesthesia Care and Evaluation    Patient location during evaluation: bedside  Patient participation: complete - patient participated  Level of consciousness: awake and alert  Pain management: adequate  Airway patency: patent  Anesthetic complications: No anesthetic complications    Cardiovascular status: acceptable  Respiratory status: acceptable  Hydration status: acceptable    Comments: /63   Pulse 64   Resp 20   Ht 175.3 cm (69\")   Wt 95.9 kg (211 lb 6.4 oz)   LMP 12/01/2021   SpO2 (!) 2%   BMI 31.22 kg/m²       "

## 2021-12-21 NOTE — H&P
[facility]  Pre Procedure History & Physical    Chief Complaint:   Gerd, epigastric pain    Subjective     HPI:   37 yo with longstanding gerd mostly well controlled with pantoprazole once daily.  Recent negative gb u/s and HIDA.  She has episodic flares that can last several days.    Past Medical History:   Past Medical History:   Diagnosis Date   • Anemia     IRON DEFICIENCY   • Anxiety    • Arthritis     MICHA KNEES   • Asthma     CHILDHOOD   • Depression    • Environmental and seasonal allergies    • Fatty liver    • Gastric erosion    • GERD (gastroesophageal reflux disease)    • IBS (irritable bowel syndrome)    • Left leg swelling     THIGH-VALVE CLOSED S/P CHILDBIRTH-FOLLOWED BY VASCULAR   • RAD (reactive airway disease)    • Right knee meniscal tear        Past Surgical History:  Past Surgical History:   Procedure Laterality Date   • CERVICAL CONIZATION     • COLONOSCOPY N/A 7/25/2018    A few erosions at 45 cm proximal to the anus   • ENDOSCOPY N/A 7/25/2018    Small hiatal hernia, gastritis   • KNEE ARTHROSCOPY      LEFT AND RIGHT   • KNEE ARTHROSCOPY Right 12/23/2019    Procedure: KNEE ARTHROSCOPY DERBRIDEMENT OF ARTHRITIS PARTIAL LATERAL MENISECTOMY;  Surgeon: Janis Stern MD;  [place] [facility] [place] [facility] [place]  Service: Orthopedics   • LIPOMA EXCISION      ankle   • ROTATOR CUFF REPAIR Right        Family History:  Family History   Problem Relation Age of Onset   • Hypertension Other    • Diabetes Other    • Stomach cancer Other    • Prostate cancer Other    • Colon polyps Mother    • Green's esophagus Father    • Colon polyps Maternal Aunt    • Colon cancer Maternal Uncle    • Colon cancer Maternal Grandmother    • Colon polyps Maternal Grandmother    • Colon cancer Cousin    • Stomach cancer Paternal Grandfather    • No Known Problems Sister    • No Known Problems Brother    • No Known Problems Paternal Aunt    • No Known Problems Paternal Uncle    • No Known Problems Maternal  Grandfather    • No Known Problems Paternal Grandmother    • Celiac disease Neg Hx    • Cirrhosis Neg Hx    • Crohn's disease Neg Hx    • Cystic fibrosis Neg Hx    • Esophageal cancer Neg Hx    • Hemochromatosis Neg Hx    • Inflammatory bowel disease Neg Hx    • Irritable bowel syndrome Neg Hx    • Liver cancer Neg Hx    • Liver disease Neg Hx    • Rectal cancer Neg Hx    • Ulcerative colitis Neg Hx    • Herber's disease Neg Hx    • Alcohol abuse Neg Hx    • Pancreatitis Neg Hx    • Anesthesia problems Neg Hx    • Broken bones Neg Hx    • Cancer Neg Hx    • Clotting disorder Neg Hx    • Collagen disease Neg Hx    • Dislocations Neg Hx    • Osteoporosis Neg Hx    • Rheumatologic disease Neg Hx    • Scoliosis Neg Hx    • Severe sprains Neg Hx    • Malig Hyperthermia Neg Hx        Social History:   reports that she has never smoked. She has never used smokeless tobacco. She reports current alcohol use. She reports that she does not use drugs.    Medications:   Medications Prior to Admission   Medication Sig Dispense Refill Last Dose   • acetaminophen (TYLENOL) 500 MG tablet Take 2 tablets by mouth Every 8 (Eight) Hours As Needed for Mild Pain . 30 tablet 0 Past Week at Unknown time   • ALLERGY SERUM INJECTION Inject  under the skin into the appropriate area as directed 1 (One) Time Per Week.   12/19/2021   • Ascorbic Acid Buffered (Buffered Vitamin C) 1000 MG capsule Take  by mouth. WITH CALCIUM CARBONATE AND MAGNESIUM   12/20/2021 at Unknown time   • cetirizine (zyrTEC) 10 MG tablet Take 10 mg by mouth Daily.   12/20/2021 at Unknown time   • clindamycin (CLEOCIN T) 1 % external solution Apply  topically to the appropriate area as directed 2 (Two) Times a Day.   12/20/2021 at Unknown time   • ferrous sulfate 325 (65 FE) MG tablet Take 325 mg by mouth Daily With Breakfast.   12/20/2021 at Unknown time   • Magnesium ([employer] Triple Magnesium Complex) 400 MG capsule Take 1 capsule by mouth Daily.   12/20/2021 at Unknown time  "  • Misc Natural Products (Cortisol Manager) tablet Take 1 tablet by mouth Daily.   12/20/2021 at Unknown time   • pantoprazole (PROTONIX) 40 MG EC tablet Take 40 mg by mouth Daily.   12/20/2021 at Unknown time   • tretinoin (RETIN-A) 0.025 % cream Apply 1 application topically to the appropriate area as directed Every Night.   12/20/2021 at Unknown time   • VITAMIN D PO Take  by mouth Daily.   12/20/2021 at Unknown time   • albuterol sulfate  (90 Base) MCG/ACT inhaler Inhale 2 puffs Every 4 (Four) Hours As Needed for Shortness of Air.   More than a month at Unknown time   • aspirin 81 MG chewable tablet Chew 81 mg Daily. HOLD PRIOR TO SURGERY PER MD INSTRUCTIONS   12/19/2021   • riFAXIMin (XIFAXAN) 550 MG tablet Take 1 tablet by mouth Every 8 (Eight) Hours. 42 tablet 2        Allergies:  Adhesive tape    ROS:    Pertinent items are noted in HPI, all other systems reviewed and negative     Objective     Blood pressure 114/85, pulse 67, resp. rate 16, height 175.3 cm (69\"), weight 95.9 kg (211 lb 6.4 oz), last menstrual period 12/01/2021, SpO2 99 %.    Physical Exam   Constitutional: Pt is oriented to person, place, and time and well-developed, well-nourished, and in no distress.   Mouth/Throat: Oropharynx is clear and moist.   Neck: Normal range of motion.   Cardiovascular: Normal rate, regular rhythm    Pulmonary/Chest: Effort normal    Abdominal: Soft. Nontender  Skin: Skin is warm and dry.   Psychiatric: Mood, memory, affect and judgment normal.     Assessment/Plan     Diagnosis:  Gerd, epigastric pain    Anticipated Surgical Procedure:  egd with biopsy    The risks, benefits, and alternatives of this procedure have been discussed with the patient or the responsible party- the patient understands and agrees to proceed.                                                              "

## 2021-12-22 LAB
LAB AP CASE REPORT: NORMAL
PATH REPORT.FINAL DX SPEC: NORMAL
PATH REPORT.GROSS SPEC: NORMAL

## 2021-12-26 NOTE — PROGRESS NOTES
Normal small bowel biopsies.  Focally active mild chronic inflammation seen in the stomach.  H. pylori is negative.  Lower esophagus with minimal chronic inflammation.  Continue current medications and acid reflux diet lifestyle modification to prevent symptoms.  Office follow-up in 1 year or sooner if symptoms are not well controlled

## 2021-12-30 ENCOUNTER — TELEPHONE (OUTPATIENT)
Dept: GASTROENTEROLOGY | Facility: CLINIC | Age: 36
End: 2021-12-30

## 2021-12-30 NOTE — TELEPHONE ENCOUNTER
----- Message from Donna Duarte MD sent at 12/26/2021  4:23 PM EST -----  Normal small bowel biopsies.  Focally active mild chronic inflammation seen in the stomach.  H. pylori is negative.  Lower esophagus with minimal chronic inflammation.  Continue current medications and acid reflux diet lifestyle modification to prevent symptoms.  Office follow-up in 1 year or sooner if symptoms are not well controlled

## 2021-12-30 NOTE — TELEPHONE ENCOUNTER
Patient called. Advised as per Dr. Duarte's note. She verb understanding and is in agreement with the plan.

## 2022-08-16 RX ORDER — PANTOPRAZOLE SODIUM 40 MG/1
40 TABLET, DELAYED RELEASE ORAL DAILY
Qty: 30 TABLET | Refills: 4 | Status: SHIPPED | OUTPATIENT
Start: 2022-08-16 | End: 2023-02-14

## 2022-08-16 NOTE — TELEPHONE ENCOUNTER
Caller: Jenna Jones    Relationship: Self    Best call back number: [phone]  Requested Prescriptions:   Requested Prescriptions     Pending Prescriptions Disp Refills   • pantoprazole (PROTONIX) 40 MG EC tablet       Sig: Take 1 tablet by mouth Daily.        Pharmacy where request should be sent:  [facility]- [id number]      Does the patient have less than a 3 day supply:  [] Yes  [x] No    Sunshine ESQUIVEL Rep   08/16/22 12:56 EDT

## 2023-02-14 RX ORDER — PANTOPRAZOLE SODIUM 40 MG/1
TABLET, DELAYED RELEASE ORAL
Qty: 30 TABLET | Refills: 1 | Status: SHIPPED | OUTPATIENT
Start: 2023-02-14

## 2023-05-26 RX ORDER — PANTOPRAZOLE SODIUM 40 MG/1
TABLET, DELAYED RELEASE ORAL
Qty: 30 TABLET | Refills: 1 | OUTPATIENT
Start: 2023-05-26

## 2023-05-30 RX ORDER — PANTOPRAZOLE SODIUM 40 MG/1
40 TABLET, DELAYED RELEASE ORAL DAILY
Qty: 30 TABLET | Refills: 1 | Status: SHIPPED | OUTPATIENT
Start: 2023-05-30

## 2023-05-30 NOTE — TELEPHONE ENCOUNTER
Caller: Jenna Jones    Relationship: Self    Best call back number: [phone]    Requested Prescriptions:   Requested Prescriptions     Pending Prescriptions Disp Refills   • pantoprazole (PROTONIX) 40 MG EC tablet 30 tablet 1     Sig: Take 1 tablet by mouth Daily.        Pharmacy where request should be sent: [facility] [id number] [facility] [id number] [facility] [address] - [phone] [facility] [phone] FX     Last office visit with prescribing clinician: Visit date not found   Last telemedicine visit with prescribing clinician: Visit date not found   Next office visit with prescribing clinician: 9/12/23    Additional details provided by patient: PT IS COMPLETELY OUT AND NEEDS REFILL. WAS ABLE TO SCHEDULE FIRST AVAILABLE WITH [facility] 9/12/23 BUT NEED RX UNTIL APPT.    Does the patient have less than a 3 day supply:  [x] Yes  [] No    Would you like a call back once the refill request has been completed: [] Yes [] No    If the office needs to give you a call back, can they leave a voicemail: [] Yes [] No    Sunshine Curtis Rep   05/30/23 11:55 EDT

## 2023-08-21 RX ORDER — PANTOPRAZOLE SODIUM 40 MG/1
40 TABLET, DELAYED RELEASE ORAL DAILY
Qty: 30 TABLET | Refills: 0 | Status: SHIPPED | OUTPATIENT
Start: 2023-08-21

## 2023-10-11 ENCOUNTER — OFFICE VISIT (OUTPATIENT)
Dept: GASTROENTEROLOGY | Facility: CLINIC | Age: 38
End: 2023-10-11
Payer: COMMERCIAL

## 2023-10-11 VITALS
SYSTOLIC BLOOD PRESSURE: 107 MMHG | BODY MASS INDEX: 33.98 KG/M2 | HEART RATE: 67 BPM | TEMPERATURE: 97.1 F | WEIGHT: 224.2 LBS | DIASTOLIC BLOOD PRESSURE: 65 MMHG | HEIGHT: 68 IN

## 2023-10-11 DIAGNOSIS — K58.0 IRRITABLE BOWEL SYNDROME WITH DIARRHEA: ICD-10-CM

## 2023-10-11 DIAGNOSIS — K21.9 GASTROESOPHAGEAL REFLUX DISEASE, UNSPECIFIED WHETHER ESOPHAGITIS PRESENT: Primary | ICD-10-CM

## 2023-10-11 PROCEDURE — 99213 OFFICE O/P EST LOW 20 MIN: CPT | Performed by: INTERNAL MEDICINE

## 2023-10-11 RX ORDER — PANTOPRAZOLE SODIUM 40 MG/1
40 TABLET, DELAYED RELEASE ORAL DAILY
Qty: 30 TABLET | Refills: 3 | Status: SHIPPED | OUTPATIENT
Start: 2023-10-11

## 2023-10-11 NOTE — PROGRESS NOTES
"Chief Complaint  Irritable Bowel Syndrome and Heartburn    Subjective          History of Present Illness    Jenna Jones is a  38 y.o. female presents for follow up for IBS and GERD. She is a patient of Dr. Duarte and is new to me. Last seen by Alma Delia Marlow PA-C on 11/17/2021.       Underwent EGD on 12/21/2021 which showed irregular Z line, erythematous mucosa in the gastric body and antrum, normal examined duodenum. Biopsies showed mild chronic inflammation in the stomach, H. Pylori negative, lower esophagus wit minimal chronic inflammation. She did have a colonoscopy in 7/25/2018 which showed a few erosions at 45cm proximal to the anus (biopsied), examination otherwise normal on direct and retroflexion views.     Today patient says she needs a refill of her pantoprazole. Reports that her reflux is well managed on pantoprazole, but says she cannot quit her medication or her symptoms become much worse. She says her IBS is well controlled since starting a probiotic. She cannot recall if she ever took the Xifaxin prescribed by Alma Delia Marlow PA-C a few years ago. Denies weight loss, nausea, vomiting, hematemesis, coffee ground emesis, melena or hematochezia. She does describe occasional abdominal pain which she attributes to gas.        Objective   Vital Signs:   /65 (BP Location: Left arm, Patient Position: Sitting, Cuff Size: Large Adult)   Pulse 67   Temp 97.1 øF (36.2 øC) (Temporal)   Ht 172.7 cm (68\")   Wt 102 kg (224 lb 3.2 oz)   BMI 34.09 kg/mý       Physical Exam     Result Review :           I reviewed most recent EGD and colonoscopy  Assessment and Plan    Diagnoses and all orders for this visit:    1. Gastroesophageal reflux disease, unspecified whether esophagitis present (Primary)  -     pantoprazole (PROTONIX) 40 MG EC tablet; Take 1 tablet by mouth Daily.  Dispense: 30 tablet; Refill: 3  -     CBC & Differential  -     Basic Metabolic Panel    2. Irritable bowel syndrome with " diarrhea      Plan:   Will check BMP and CBC today given chronic PPI use.   Refill pantoprazole. Recommend lifestyle and dietary modifications to help with symptoms as well.   IBS well controlled - may benefit from fiber supplementation     Follow Up   No follow-ups on file.    Dragon dictation used throughout this note.     Gillian Willingham PA-C

## 2024-03-03 DIAGNOSIS — K21.9 GASTROESOPHAGEAL REFLUX DISEASE, UNSPECIFIED WHETHER ESOPHAGITIS PRESENT: ICD-10-CM

## 2024-03-06 RX ORDER — PANTOPRAZOLE SODIUM 40 MG/1
40 TABLET, DELAYED RELEASE ORAL DAILY
Qty: 30 TABLET | Refills: 3 | Status: SHIPPED | OUTPATIENT
Start: 2024-03-06

## 2024-03-07 ENCOUNTER — TELEPHONE (OUTPATIENT)
Dept: GASTROENTEROLOGY | Facility: CLINIC | Age: 39
End: 2024-03-07
Payer: COMMERCIAL

## 2024-03-07 LAB
BASOPHILS # BLD AUTO: 0 X10E3/UL (ref 0–0.2)
BASOPHILS NFR BLD AUTO: 1 %
BUN SERPL-MCNC: 12 MG/DL (ref 6–24)
BUN/CREAT SERPL: 12 (ref 9–23)
CALCIUM SERPL-MCNC: 9.1 MG/DL (ref 8.7–10.2)
CHLORIDE SERPL-SCNC: 104 MMOL/L (ref 96–106)
CO2 SERPL-SCNC: 20 MMOL/L (ref 20–29)
CREAT SERPL-MCNC: 1 MG/DL (ref 0.57–1)
EGFRCR SERPLBLD CKD-EPI 2021: 73 ML/MIN/1.73
EOSINOPHIL # BLD AUTO: 0.1 X10E3/UL (ref 0–0.4)
EOSINOPHIL NFR BLD AUTO: 2 %
ERYTHROCYTE [DISTWIDTH] IN BLOOD BY AUTOMATED COUNT: 15 % (ref 11.7–15.4)
GLUCOSE SERPL-MCNC: 86 MG/DL (ref 70–99)
HCT VFR BLD AUTO: 37.3 % (ref 34–46.6)
HGB BLD-MCNC: 11.6 G/DL (ref 11.1–15.9)
IMM GRANULOCYTES # BLD AUTO: 0 X10E3/UL (ref 0–0.1)
IMM GRANULOCYTES NFR BLD AUTO: 0 %
LYMPHOCYTES # BLD AUTO: 2.1 X10E3/UL (ref 0.7–3.1)
LYMPHOCYTES NFR BLD AUTO: 38 %
MCH RBC QN AUTO: 23.6 PG (ref 26.6–33)
MCHC RBC AUTO-ENTMCNC: 31.1 G/DL (ref 31.5–35.7)
MCV RBC AUTO: 76 FL (ref 79–97)
MONOCYTES # BLD AUTO: 0.5 X10E3/UL (ref 0.1–0.9)
MONOCYTES NFR BLD AUTO: 9 %
NEUTROPHILS # BLD AUTO: 2.8 X10E3/UL (ref 1.4–7)
NEUTROPHILS NFR BLD AUTO: 50 %
PLATELET # BLD AUTO: 246 X10E3/UL (ref 150–450)
POTASSIUM SERPL-SCNC: 4.4 MMOL/L (ref 3.5–5.2)
RBC # BLD AUTO: 4.91 X10E6/UL (ref 3.77–5.28)
SODIUM SERPL-SCNC: 138 MMOL/L (ref 134–144)
WBC # BLD AUTO: 5.4 X10E3/UL (ref 3.4–10.8)

## 2024-03-07 NOTE — TELEPHONE ENCOUNTER
----- Message from Gillian Willingham PA-C sent at 3/7/2024  7:33 AM EST -----  Labs look good and are around her baseline.

## 2024-04-23 ENCOUNTER — APPOINTMENT (OUTPATIENT)
Dept: WOMENS IMAGING | Facility: HOSPITAL | Age: 39
End: 2024-04-23
Payer: COMMERCIAL

## 2024-04-23 PROCEDURE — 77063 BREAST TOMOSYNTHESIS BI: CPT | Performed by: RADIOLOGY

## 2024-04-23 PROCEDURE — 77067 SCR MAMMO BI INCL CAD: CPT | Performed by: RADIOLOGY

## 2024-04-29 NOTE — PROGRESS NOTES
Chief Complaint  Heartburn    Subjective          History of Present Illness    Jenna Jones is a  39 y.o. female presents for follow-up for IBS and GERD.  She is a patient of Dr. Duarte. Last seen in the office by me 10/11/2023.    Patient reports that over the last several months she has been noticing more more breakthrough symptoms of pantoprazole.  She says she generally does okay but she is having worsening flares and reflux sometimes lasting up to 2 days at a time.  She says that she tries very hard to avoid acidic/spicy foods because if she eats even a little bit of red sauce she does have quite a bit of reflux and discomfort.  She denies nausea, vomiting, abdominal pain, occultly swallowing, black or bloody stool.  IBS is pretty well-controlled at the moment-she does not take fiber supplement but has been trying to increase fiber in her diet.      EGD on 12/21/2021 which showed irregular Z line, erythematous mucosa in the gastric body and antrum, normal examined duodenum. Biopsies showed mild chronic inflammation in the stomach, H. Pylori negative, lower esophagus wit minimal chronic inflammation.     Colonoscopy in 7/25/2018 which showed a few erosions at 45cm proximal to the anus (biopsied), examination otherwise normal on direct and retroflexion views.     Family history of polyps in mother. No family history of colon cancer.     Objective   Vital Signs:   /71   Pulse 70   Temp 97.1 °F (36.2 °C)       Physical Exam  Constitutional:       General: She is not in acute distress.     Appearance: Normal appearance.   Eyes:      General: No scleral icterus.  Cardiovascular:      Rate and Rhythm: Normal rate.   Pulmonary:      Effort: Pulmonary effort is normal.   Abdominal:      General: Abdomen is flat. Bowel sounds are normal. There is no distension.      Tenderness: There is no abdominal tenderness. There is no guarding.   Skin:     Coloration: Skin is not jaundiced.   Neurological:       General: No focal deficit present.      Mental Status: She is alert and oriented to person, place, and time.   Psychiatric:         Mood and Affect: Mood normal.         Behavior: Behavior normal.          Result Review :   The following data was reviewed by: Gillian Willingham PA-C on 04/30/2024:  CMP          3/6/2024    10:38   CMP   Glucose 86    BUN 12    Creatinine 1.00    Sodium 138    Potassium 4.4    Chloride 104    Calcium 9.1    BUN/Creatinine Ratio 12      CBC          12/6/2023    16:50 3/6/2024    10:38   CBC   WBC 7.56     5.4    RBC 4.71     4.91    Hemoglobin 11.1     11.6    Hematocrit 36.0     37.3    MCV 76.4     76    MCH 23.6     23.6    MCHC 30.8     31.1    RDW 15.2     15.0    Platelets 253     246       Details          This result is from an external source.                     Assessment:   Diagnoses and all orders for this visit:    1. Gastroesophageal reflux disease, unspecified whether esophagitis present (Primary)  -     esomeprazole (nexIUM) 40 MG capsule; Take 1 capsule by mouth Daily.  Dispense: 60 capsule; Refill: 0    2. Irritable bowel syndrome with both constipation and diarrhea          Plan:   -She has tried and failed omeprazole in the past and she has been on pantoprazole for quite some time.  Will switch to esomeprazole for couple weeks twice daily and see how she does.  If symptoms improve we will plan to continue on esomeprazole for a while.  If symptoms do not improve we will plan to evaluate with EGD.  -Advised that she avoid spicy acidic foods, not eat several hours before bedtime and remain in upright position after eating.  -In the event that she will need an EGD we did go ahead and discuss risks (including, but not limited to perforation, bleeding, sedation-related complications, and death), benefits, and alternatives to the procedure and all questions were answered.         Follow Up   No follow-ups on file.    Dragon dictation used throughout this note.          Gillian Willingham PA-C  [facility]  [facility] [place] [address]  Office: [phone]

## 2024-04-30 ENCOUNTER — OFFICE VISIT (OUTPATIENT)
Dept: GASTROENTEROLOGY | Facility: CLINIC | Age: 39
End: 2024-04-30
Payer: COMMERCIAL

## 2024-04-30 VITALS — DIASTOLIC BLOOD PRESSURE: 71 MMHG | SYSTOLIC BLOOD PRESSURE: 106 MMHG | TEMPERATURE: 97.1 F | HEART RATE: 70 BPM

## 2024-04-30 DIAGNOSIS — K58.2 IRRITABLE BOWEL SYNDROME WITH BOTH CONSTIPATION AND DIARRHEA: ICD-10-CM

## 2024-04-30 DIAGNOSIS — K21.9 GASTROESOPHAGEAL REFLUX DISEASE, UNSPECIFIED WHETHER ESOPHAGITIS PRESENT: Primary | ICD-10-CM

## 2024-04-30 PROCEDURE — 99214 OFFICE O/P EST MOD 30 MIN: CPT

## 2024-04-30 PROCEDURE — 1160F RVW MEDS BY RX/DR IN RCRD: CPT

## 2024-04-30 PROCEDURE — 1159F MED LIST DOCD IN RCRD: CPT

## 2024-04-30 RX ORDER — ESOMEPRAZOLE MAGNESIUM 40 MG/1
40 CAPSULE, DELAYED RELEASE ORAL DAILY
Qty: 60 CAPSULE | Refills: 0 | Status: SHIPPED | OUTPATIENT
Start: 2024-04-30

## 2024-04-30 RX ORDER — EPINEPHRINE 0.3 MG/.3ML
INJECTION SUBCUTANEOUS
COMMUNITY
Start: 2024-04-10

## 2024-04-30 NOTE — Clinical Note
39-year-old female with history of IBS mixed and GERD previously maintained on pantoprazole twice daily has reported a couple months of worsening reflux symptoms.  She has noticed more and more breakthrough symptoms and flares sometimes lasting up to 2 days.  She has been on pantoprazole for quite some time so I advise she switch over to Nexium for couple weeks to see if she notices any difference.  If symptoms persist we did discuss proceeding with EGD for further evaluation as she has been noted to have erythematous mucosa in gastric body in the past

## 2024-08-22 DIAGNOSIS — K21.9 GASTROESOPHAGEAL REFLUX DISEASE, UNSPECIFIED WHETHER ESOPHAGITIS PRESENT: ICD-10-CM

## 2024-08-22 RX ORDER — PANTOPRAZOLE SODIUM 40 MG/1
40 TABLET, DELAYED RELEASE ORAL DAILY
Qty: 30 TABLET | Refills: 3 | OUTPATIENT
Start: 2024-08-22

## 2024-08-29 DIAGNOSIS — K21.9 GASTROESOPHAGEAL REFLUX DISEASE, UNSPECIFIED WHETHER ESOPHAGITIS PRESENT: Primary | ICD-10-CM

## 2024-08-29 RX ORDER — PANTOPRAZOLE SODIUM 40 MG/1
40 TABLET, DELAYED RELEASE ORAL
Qty: 180 TABLET | Refills: 1 | Status: SHIPPED | OUTPATIENT
Start: 2024-08-29

## 2024-09-10 ENCOUNTER — TELEPHONE (OUTPATIENT)
Dept: GASTROENTEROLOGY | Facility: CLINIC | Age: 39
End: 2024-09-10
Payer: COMMERCIAL

## 2024-09-10 ENCOUNTER — OFFICE VISIT (OUTPATIENT)
Dept: GASTROENTEROLOGY | Facility: CLINIC | Age: 39
End: 2024-09-10
Payer: COMMERCIAL

## 2024-09-10 VITALS
DIASTOLIC BLOOD PRESSURE: 72 MMHG | BODY MASS INDEX: 34.74 KG/M2 | HEIGHT: 68 IN | WEIGHT: 229.2 LBS | SYSTOLIC BLOOD PRESSURE: 105 MMHG

## 2024-09-10 DIAGNOSIS — R11.0 NAUSEA: ICD-10-CM

## 2024-09-10 DIAGNOSIS — K21.9 GASTROESOPHAGEAL REFLUX DISEASE, UNSPECIFIED WHETHER ESOPHAGITIS PRESENT: Primary | ICD-10-CM

## 2024-09-10 PROCEDURE — 1160F RVW MEDS BY RX/DR IN RCRD: CPT

## 2024-09-10 PROCEDURE — 99214 OFFICE O/P EST MOD 30 MIN: CPT

## 2024-09-10 PROCEDURE — 1159F MED LIST DOCD IN RCRD: CPT

## 2024-09-10 RX ORDER — SUCRALFATE 1 G/1
1 TABLET ORAL 2 TIMES DAILY PRN
Qty: 180 TABLET | Refills: 0 | Status: SHIPPED | OUTPATIENT
Start: 2024-09-10

## 2024-09-10 NOTE — TELEPHONE ENCOUNTER
ALEX - FOR [facility] WITH PROVIDER - [facility] WILL CALL WITH ARRIVE TIME A WEEK PRIOR - DATE IS EGD   11/01/2024

## 2024-09-10 NOTE — PROGRESS NOTES
"Chief Complaint  Heartburn    Subjective          History of Present Illness    Jenna Jones is a  39 y.o. female presents for follow-up for IBS and GERD.  She is a patient of Dr. Duarte.    She was last seen in the office 4/30/2024.  At that time she was having more frequent breakthrough symptoms of reflux despite being on pantoprazole.  Sometimes these flares would last up to 2 days at a time despite trying to avoid spicy foods.  She was switched to esomeprazole at last visit but states that she does not feel like this helped her as much of the pantoprazole so she switched back.  She is still having frequent breakthrough symptoms several times a week.  No nausea or vomiting.  She does have some abdominal discomfort.  She has pretty regular bowel movements but tends to be more on the constipated side.    She has tried pantoprazole, omeprazole and esomeprazole but continues to have breakthrough symptoms.     EGD on 12/21/2021 which showed irregular Z line, erythematous mucosa in the gastric body and antrum, normal examined duodenum. Biopsies showed mild chronic inflammation in the stomach, H. Pylori negative, lower esophagus with minimal chronic inflammation.      Colonoscopy in 7/25/2018 which showed a few erosions at 45cm proximal to the anus (biopsied), examination otherwise normal on direct and retroflexion views.      Family history of polyps in mother. No family history of colon cancer.     Objective   Vital Signs:   /72 (BP Location: Left arm, Patient Position: Sitting, Cuff Size: Large Adult)   Ht 172.7 cm (68\")   Wt 104 kg (229 lb 3.2 oz)   BMI 34.85 kg/m²       Physical Exam  Constitutional:       General: She is not in acute distress.     Appearance: Normal appearance.   Eyes:      General: No scleral icterus.  Cardiovascular:      Rate and Rhythm: Normal rate.   Pulmonary:      Effort: Pulmonary effort is normal.   Abdominal:      General: Abdomen is flat. There is no distension.      " Tenderness: There is no abdominal tenderness. There is no guarding.   Skin:     Coloration: Skin is not jaundiced.   Neurological:      General: No focal deficit present.      Mental Status: She is alert and oriented to person, place, and time.   Psychiatric:         Mood and Affect: Mood normal.         Behavior: Behavior normal.          Result Review :   The following data was reviewed by: Gillian Willingham PA-C on 09/10/2024:  CMP          3/6/2024    10:38   CMP   Glucose 86    BUN 12    Creatinine 1.00    Sodium 138    Potassium 4.4    Chloride 104    Calcium 9.1    BUN/Creatinine Ratio 12      CBC          12/6/2023    16:50 3/6/2024    10:38   CBC   WBC 7.56     5.4    RBC 4.71     4.91    Hemoglobin 11.1     11.6    Hematocrit 36.0     37.3    MCV 76.4     76    MCH 23.6     23.6    MCHC 30.8     31.1    RDW 15.2     15.0    Platelets 253     246       Details          This result is from an external source.                     Assessment:   Diagnoses and all orders for this visit:    1. Gastroesophageal reflux disease, unspecified whether esophagitis present (Primary)  -     Case Request; Standing  -     Case Request  -     sucralfate (Carafate) 1 g tablet; Take 1 tablet by mouth 2 (Two) Times a Day As Needed (for breakthrough in reflux).  Dispense: 180 tablet; Refill: 0    2. Nausea  -     Case Request; Standing  -     Case Request  -     sucralfate (Carafate) 1 g tablet; Take 1 tablet by mouth 2 (Two) Times a Day As Needed (for breakthrough in reflux).  Dispense: 180 tablet; Refill: 0    Other orders  -     Implement Anesthesia orders day of procedure.; Standing  -     Follow Anesthesia Guidelines / Protocol; Future          Plan:   -Given persistent symptoms recommend proceeding with EGD.  May need to try lansoprazole in the future possibly Dexilant.  Will trial Carafate twice daily as needed for now as patient would like to stick to pantoprazole for the time being.    -Advised fiber supplement to help  regulate bowels or possibly MiraLAX daily/as needed  -Discussed GERD precautions-avoiding spicy foods, not eating at bedtime, remaining upright position after eating.      Follow Up   Return in about 3 months (around 12/10/2024).    Dragon dictation used throughout this note.         Gillian Willingham PA-C  [facility]  [address] [place] [address]  Office: [phone]

## 2024-11-01 ENCOUNTER — LAB REQUISITION (OUTPATIENT)
Dept: LAB | Facility: HOSPITAL | Age: 39
End: 2024-11-01

## 2024-11-01 ENCOUNTER — OUTSIDE FACILITY SERVICE (OUTPATIENT)
Dept: GASTROENTEROLOGY | Facility: CLINIC | Age: 39
End: 2024-11-01
Payer: COMMERCIAL

## 2024-11-01 DIAGNOSIS — K21.9 GASTRO-ESOPHAGEAL REFLUX DISEASE WITHOUT ESOPHAGITIS: ICD-10-CM

## 2024-11-01 DIAGNOSIS — K21.9 GASTROESOPHAGEAL REFLUX DISEASE, UNSPECIFIED WHETHER ESOPHAGITIS PRESENT: ICD-10-CM

## 2024-11-01 PROCEDURE — 88305 TISSUE EXAM BY PATHOLOGIST: CPT | Performed by: INTERNAL MEDICINE

## 2024-11-01 RX ORDER — PANTOPRAZOLE SODIUM 40 MG/1
40 TABLET, DELAYED RELEASE ORAL
Qty: 180 TABLET | Refills: 1 | Status: SHIPPED | OUTPATIENT
Start: 2024-11-01

## 2024-11-04 LAB
CYTO UR: NORMAL
LAB AP CASE REPORT: NORMAL
LAB AP CLINICAL INFORMATION: NORMAL
PATH REPORT.FINAL DX SPEC: NORMAL
PATH REPORT.GROSS SPEC: NORMAL

## 2024-11-05 ENCOUNTER — TELEPHONE (OUTPATIENT)
Dept: GASTROENTEROLOGY | Facility: CLINIC | Age: 39
End: 2024-11-05

## 2024-11-05 NOTE — TELEPHONE ENCOUNTER
Gastric biopsies were normal - no significant inflammation.  Mild esophageal reflux related inflammation was noted on exam.  Recommend pantoprazole twice daily-take prior to meals.  Office follow-up in 6 weeks with PA-CHIDI   Written by Donna Duarte MD on 11/5/2024  8:29 AM EST  Seen by patient Jenna Watsonerson on 11/5/2024  9:07 AM

## 2024-11-05 NOTE — PROGRESS NOTES
Gastric biopsies were normal - no significant inflammation.  Mild esophageal reflux related inflammation was noted on exam.  Recommend pantoprazole twice daily-take prior to meals.  Office follow-up in 6 weeks with FRANCESCO

## 2025-05-02 ENCOUNTER — APPOINTMENT (OUTPATIENT)
Dept: WOMENS IMAGING | Facility: HOSPITAL | Age: 40
End: 2025-05-02
Payer: COMMERCIAL

## 2025-05-02 PROCEDURE — 77063 BREAST TOMOSYNTHESIS BI: CPT | Performed by: RADIOLOGY

## 2025-05-02 PROCEDURE — 77067 SCR MAMMO BI INCL CAD: CPT | Performed by: RADIOLOGY

## 2025-06-24 ENCOUNTER — APPOINTMENT (OUTPATIENT)
Dept: CT IMAGING | Facility: HOSPITAL | Age: 40
End: 2025-06-24
Payer: COMMERCIAL

## 2025-06-24 ENCOUNTER — HOSPITAL ENCOUNTER (EMERGENCY)
Facility: HOSPITAL | Age: 40
Discharge: HOME | End: 2025-06-24
Attending: EMERGENCY MEDICINE | Admitting: EMERGENCY MEDICINE
Payer: COMMERCIAL

## 2025-06-24 ENCOUNTER — APPOINTMENT (OUTPATIENT)
Dept: GENERAL RADIOLOGY | Facility: HOSPITAL | Age: 40
End: 2025-06-24
Payer: COMMERCIAL

## 2025-06-24 VITALS
WEIGHT: 215 LBS | RESPIRATION RATE: 16 BRPM | TEMPERATURE: 98 F | OXYGEN SATURATION: 100 % | DIASTOLIC BLOOD PRESSURE: 61 MMHG | HEIGHT: 69 IN | HEART RATE: 49 BPM | BODY MASS INDEX: 31.84 KG/M2 | SYSTOLIC BLOOD PRESSURE: 100 MMHG

## 2025-06-24 DIAGNOSIS — R06.00 DYSPNEA, UNSPECIFIED TYPE: Primary | ICD-10-CM

## 2025-06-24 DIAGNOSIS — M54.6 CHRONIC THORACIC BACK PAIN, UNSPECIFIED BACK PAIN LATERALITY: ICD-10-CM

## 2025-06-24 DIAGNOSIS — G89.29 CHRONIC THORACIC BACK PAIN, UNSPECIFIED BACK PAIN LATERALITY: ICD-10-CM

## 2025-06-24 LAB
ALBUMIN SERPL-MCNC: 4 G/DL (ref 3.5–5.2)
ALBUMIN/GLOB SERPL: 1.7 G/DL
ALP SERPL-CCNC: 50 U/L (ref 39–117)
ALT SERPL W P-5'-P-CCNC: 9 U/L (ref 1–33)
ANION GAP SERPL CALCULATED.3IONS-SCNC: 9.1 MMOL/L (ref 5–15)
APTT PPP: 32.6 SECONDS (ref 22.7–35.4)
AST SERPL-CCNC: 15 U/L (ref 1–32)
BASOPHILS # BLD AUTO: 0.03 10*3/MM3 (ref 0–0.2)
BASOPHILS NFR BLD AUTO: 0.5 % (ref 0–1.5)
BILIRUB SERPL-MCNC: 0.3 MG/DL (ref 0–1.2)
BUN SERPL-MCNC: 18 MG/DL (ref 6–20)
BUN/CREAT SERPL: 17.3 (ref 7–25)
CALCIUM SPEC-SCNC: 8.8 MG/DL (ref 8.6–10.5)
CHLORIDE SERPL-SCNC: 105 MMOL/L (ref 98–107)
CO2 SERPL-SCNC: 23.9 MMOL/L (ref 22–29)
CREAT SERPL-MCNC: 1.04 MG/DL (ref 0.57–1)
D DIMER PPP FEU-MCNC: <0.27 MCGFEU/ML (ref 0–0.5)
DEPRECATED RDW RBC AUTO: 48.9 FL (ref 37–54)
EGFRCR SERPLBLD CKD-EPI 2021: 69.4 ML/MIN/1.73
EOSINOPHIL # BLD AUTO: 0.11 10*3/MM3 (ref 0–0.4)
EOSINOPHIL NFR BLD AUTO: 1.7 % (ref 0.3–6.2)
ERYTHROCYTE [DISTWIDTH] IN BLOOD BY AUTOMATED COUNT: 15.7 % (ref 12.3–15.4)
GEN 5 1HR TROPONIN T REFLEX: <6 NG/L
GLOBULIN UR ELPH-MCNC: 2.4 GM/DL
GLUCOSE SERPL-MCNC: 92 MG/DL (ref 65–99)
HCG SERPL QL: NEGATIVE
HCT VFR BLD AUTO: 40.5 % (ref 34–46.6)
HGB BLD-MCNC: 13.3 G/DL (ref 12–15.9)
IMM GRANULOCYTES # BLD AUTO: 0.02 10*3/MM3 (ref 0–0.05)
IMM GRANULOCYTES NFR BLD AUTO: 0.3 % (ref 0–0.5)
INR PPP: 1.3 (ref 0.9–1.1)
LYMPHOCYTES # BLD AUTO: 2.55 10*3/MM3 (ref 0.7–3.1)
LYMPHOCYTES NFR BLD AUTO: 40.1 % (ref 19.6–45.3)
MAGNESIUM SERPL-MCNC: 2.1 MG/DL (ref 1.6–2.6)
MCH RBC QN AUTO: 28.1 PG (ref 26.6–33)
MCHC RBC AUTO-ENTMCNC: 32.8 G/DL (ref 31.5–35.7)
MCV RBC AUTO: 85.4 FL (ref 79–97)
MONOCYTES # BLD AUTO: 0.58 10*3/MM3 (ref 0.1–0.9)
MONOCYTES NFR BLD AUTO: 9.1 % (ref 5–12)
NEUTROPHILS NFR BLD AUTO: 3.07 10*3/MM3 (ref 1.7–7)
NEUTROPHILS NFR BLD AUTO: 48.3 % (ref 42.7–76)
NRBC BLD AUTO-RTO: 0 /100 WBC (ref 0–0.2)
NT-PROBNP SERPL-MCNC: 57.8 PG/ML (ref 0–450)
PLATELET # BLD AUTO: 199 10*3/MM3 (ref 140–450)
PMV BLD AUTO: 10 FL (ref 6–12)
POTASSIUM SERPL-SCNC: 4.1 MMOL/L (ref 3.5–5.2)
PROT SERPL-MCNC: 6.4 G/DL (ref 6–8.5)
PROTHROMBIN TIME: 16.1 SECONDS (ref 11.7–14.2)
QT INTERVAL: 464 MS
QTC INTERVAL: 409 MS
RBC # BLD AUTO: 4.74 10*6/MM3 (ref 3.77–5.28)
SODIUM SERPL-SCNC: 138 MMOL/L (ref 136–145)
TROPONIN T NUMERIC DELTA: NORMAL
TROPONIN T SERPL HS-MCNC: <6 NG/L
WBC NRBC COR # BLD AUTO: 6.36 10*3/MM3 (ref 3.4–10.8)

## 2025-06-24 PROCEDURE — 84484 ASSAY OF TROPONIN QUANT: CPT | Performed by: EMERGENCY MEDICINE

## 2025-06-24 PROCEDURE — 99285 EMERGENCY DEPT VISIT HI MDM: CPT

## 2025-06-24 PROCEDURE — 85610 PROTHROMBIN TIME: CPT | Performed by: EMERGENCY MEDICINE

## 2025-06-24 PROCEDURE — 85379 FIBRIN DEGRADATION QUANT: CPT | Performed by: EMERGENCY MEDICINE

## 2025-06-24 PROCEDURE — 94761 N-INVAS EAR/PLS OXIMETRY MLT: CPT

## 2025-06-24 PROCEDURE — 36415 COLL VENOUS BLD VENIPUNCTURE: CPT

## 2025-06-24 PROCEDURE — 84703 CHORIONIC GONADOTROPIN ASSAY: CPT | Performed by: EMERGENCY MEDICINE

## 2025-06-24 PROCEDURE — 85025 COMPLETE CBC W/AUTO DIFF WBC: CPT | Performed by: EMERGENCY MEDICINE

## 2025-06-24 PROCEDURE — 83735 ASSAY OF MAGNESIUM: CPT | Performed by: EMERGENCY MEDICINE

## 2025-06-24 PROCEDURE — 71045 X-RAY EXAM CHEST 1 VIEW: CPT

## 2025-06-24 PROCEDURE — 94640 AIRWAY INHALATION TREATMENT: CPT

## 2025-06-24 PROCEDURE — 25510000001 IOPAMIDOL PER 1 ML: Performed by: EMERGENCY MEDICINE

## 2025-06-24 PROCEDURE — 85730 THROMBOPLASTIN TIME PARTIAL: CPT | Performed by: EMERGENCY MEDICINE

## 2025-06-24 PROCEDURE — 93005 ELECTROCARDIOGRAM TRACING: CPT | Performed by: EMERGENCY MEDICINE

## 2025-06-24 PROCEDURE — 94799 UNLISTED PULMONARY SVC/PX: CPT

## 2025-06-24 PROCEDURE — 94760 N-INVAS EAR/PLS OXIMETRY 1: CPT

## 2025-06-24 PROCEDURE — 83880 ASSAY OF NATRIURETIC PEPTIDE: CPT | Performed by: EMERGENCY MEDICINE

## 2025-06-24 PROCEDURE — 80053 COMPREHEN METABOLIC PANEL: CPT | Performed by: EMERGENCY MEDICINE

## 2025-06-24 PROCEDURE — 93010 ELECTROCARDIOGRAM REPORT: CPT | Performed by: INTERNAL MEDICINE

## 2025-06-24 PROCEDURE — 71275 CT ANGIOGRAPHY CHEST: CPT

## 2025-06-24 RX ORDER — IOPAMIDOL 755 MG/ML
100 INJECTION, SOLUTION INTRAVASCULAR
Status: COMPLETED | OUTPATIENT
Start: 2025-06-24 | End: 2025-06-24

## 2025-06-24 RX ORDER — SODIUM CHLORIDE 0.9 % (FLUSH) 0.9 %
10 SYRINGE (ML) INJECTION AS NEEDED
Status: DISCONTINUED | OUTPATIENT
Start: 2025-06-24 | End: 2025-06-24 | Stop reason: HOSPADM

## 2025-06-24 RX ORDER — NITROGLYCERIN 0.4 MG/1
0.4 TABLET SUBLINGUAL
Status: DISCONTINUED | OUTPATIENT
Start: 2025-06-24 | End: 2025-06-24 | Stop reason: HOSPADM

## 2025-06-24 RX ORDER — ALBUTEROL SULFATE 90 UG/1
2 INHALANT RESPIRATORY (INHALATION) EVERY 4 HOURS PRN
Qty: 6.7 G | Refills: 0 | Status: SHIPPED | OUTPATIENT
Start: 2025-06-24

## 2025-06-24 RX ORDER — METAXALONE 800 MG/1
800 TABLET ORAL 3 TIMES DAILY
Qty: 21 TABLET | Refills: 0 | Status: SHIPPED | OUTPATIENT
Start: 2025-06-24

## 2025-06-24 RX ORDER — IPRATROPIUM BROMIDE AND ALBUTEROL SULFATE 2.5; .5 MG/3ML; MG/3ML
3 SOLUTION RESPIRATORY (INHALATION) ONCE
Status: COMPLETED | OUTPATIENT
Start: 2025-06-24 | End: 2025-06-24

## 2025-06-24 RX ADMIN — IOPAMIDOL 95 ML: 755 INJECTION, SOLUTION INTRAVENOUS at 05:07

## 2025-06-24 RX ADMIN — IPRATROPIUM BROMIDE AND ALBUTEROL SULFATE 3 ML: .5; 3 SOLUTION RESPIRATORY (INHALATION) at 03:37

## 2025-06-24 NOTE — ED PROVIDER NOTES
EMERGENCY DEPARTMENT ENCOUNTER    Room Number:  15/15  PCP: Shameka Rodrigues MD  Patient Care Team:  Shameka Rodrigues MD as PCP - General (Internal Medicine)  Viviane Morrow MD as PCP - Family Medicine   Independent Historians: Patient    HPI:  Chief Complaint: Back pain, dyspnea    A complete HPI/ROS/PMH/PSH/SH/FH are unobtainable due to: None    Chronic or social conditions impacting patient care (Social Determinants of Health): None  (Financial Resource Strain / Food Insecurity / Transportation Needs / Physical Activity / Stress / Social Connections / Intimate Partner Violence / Housing Stability)    Context: Jenna Jones is a 40 y.o. female who presents to the ED c/o acute mild dyspnea which occurred over the course of the day.  Patient also has pain across her shoulders which has been chronic in nature.  Nothing really makes it better or worse.  Patient has history of connective tissue disorder and family history of aortic dissection.  Patient was recently seen at  for concern for possible aortic dissection.  Workup was completely benign.  Patient denies any pain in her chest no nausea vomiting no fever chills no abdominal pain.  Patient denies any cough or congestion.  Physic she can occasionally cannot get enough air.  No fever or chills.    Review of prior external notes (non-ED) -and- Review of prior external test results outside of this encounter: Patient's ED note from  from 6/5/2025    Prescription drug monitoring program review:         PAST MEDICAL HISTORY  Active Ambulatory Problems     Diagnosis Date Noted    Pain 08/16/2016    Pain in both lower extremities 04/13/2017    Early satiety 06/05/2018    Nausea 06/05/2018    Epigastric pain 06/05/2018    Tear of lateral meniscus of right knee, current 10/21/2019    Irritable bowel syndrome with both constipation and diarrhea 09/22/2021     Resolved Ambulatory Problems     Diagnosis Date Noted    No Resolved Ambulatory Problems      Past Medical History:   Diagnosis Date    Anemia     Anxiety     Arthritis     Asthma     Depression     Environmental and seasonal allergies     Fatty liver     Gastric erosion     GERD (gastroesophageal reflux disease)     Hernia     IBS (irritable bowel syndrome)     Left leg swelling     Monoallelic mutation of MYH11 gene     RAD (reactive airway disease)     Right knee meniscal tear          PAST SURGICAL HISTORY  Past Surgical History:   Procedure Laterality Date    CERVICAL CONIZATION      COLONOSCOPY N/A 07/25/2018    A few erosions at 45 cm proximal to the anus    ENDOSCOPY N/A 07/25/2018    Small hiatal hernia, gastritis    ENDOSCOPY N/A 12/21/2021    Procedure: ESOPHAGOGASTRODUODENOSCOPY with biopsies;  Surgeon: Donna Duarte MD;  Location: [facility] ENDOSCOPY;  Service: Gastroenterology;  Laterality: N/A;  Pre: GERD, Epigastric pain  Post: gastritis, irregular z-line    KNEE ARTHROSCOPY      LEFT AND RIGHT    KNEE ARTHROSCOPY Right 12/23/2019    Procedure: KNEE ARTHROSCOPY DERBRIDEMENT OF ARTHRITIS PARTIAL LATERAL MENISECTOMY;  Surgeon: Janis Stern MD;  [place]  [facility]  Service: Orthopedics    LIPOMA EXCISION      ankle    ROTATOR CUFF REPAIR Right          FAMILY HISTORY  Family History   Problem Relation Age of Onset    Hypertension Other     Diabetes Other     Stomach cancer Other     Prostate cancer Other     Colon polyps Mother     Green's esophagus Father     Colon polyps Maternal Aunt     Colon cancer Maternal Uncle     Colon cancer Maternal Grandmother     Colon polyps Maternal Grandmother     Colon cancer Cousin     Stomach cancer Paternal Grandfather     No Known Problems Sister     No Known Problems Brother     No Known Problems Paternal Aunt     No Known Problems Paternal Uncle     No Known Problems Maternal Grandfather     No Known Problems Paternal Grandmother     Celiac disease Neg Hx     Cirrhosis Neg Hx     Crohn's disease Neg Hx     Cystic fibrosis Neg Hx      Esophageal cancer Neg Hx     Hemochromatosis Neg Hx     Inflammatory bowel disease Neg Hx     Irritable bowel syndrome Neg Hx     Liver cancer Neg Hx     Liver disease Neg Hx     Rectal cancer Neg Hx     Ulcerative colitis Neg Hx     Herber's disease Neg Hx     Alcohol abuse Neg Hx     Pancreatitis Neg Hx     Anesthesia problems Neg Hx     Broken bones Neg Hx     Cancer Neg Hx     Clotting disorder Neg Hx     Collagen disease Neg Hx     Dislocations Neg Hx     Osteoporosis Neg Hx     Rheumatologic disease Neg Hx     Scoliosis Neg Hx     Severe sprains Neg Hx     Malig Hyperthermia Neg Hx          SOCIAL HISTORY  Social History     Socioeconomic History    Marital status:    Tobacco Use    Smoking status: Never    Smokeless tobacco: Never   Vaping Use    Vaping status: Never Used   Substance and Sexual Activity    Alcohol use: Yes     Comment: rare     Drug use: No    Sexual activity: Yes     Partners: Male     Birth control/protection: I.U.D.         ALLERGIES  Adhesive tape        REVIEW OF SYSTEMS  Review of Systems  Included in HPI  All systems reviewed and negative except for those discussed in HPI.      PHYSICAL EXAM    I have reviewed the triage vital signs and nursing notes.    ED Triage Vitals   Temp Heart Rate Resp BP SpO2   06/24/25 0235 06/24/25 0235 06/24/25 0235 06/24/25 0239 06/24/25 0235   98 °F (36.7 °C) 62 16 125/71 98 %      Temp src Heart Rate Source Patient Position BP Location FiO2 (%)   06/24/25 0235 06/24/25 0235 -- -- --   Oral Monitor          Physical Exam  GENERAL: alert, no acute distress  SKIN: Warm, dry  HENT: Normocephalic, atraumatic  EYES: no scleral icterus  CV: regular rhythm, regular rate  RESPIRATORY: normal effort, lungs clear  ABDOMEN: soft, nontender, nondistended  MUSCULOSKELETAL: no deformity  NEURO: alert, moves all extremities, follows commands                                                               LAB RESULTS  Recent Results (from the past 24 hours)   ECG  12 Lead Chest Pain    Collection Time: 06/24/25  3:04 AM   Result Value Ref Range    QT Interval 464 ms    QTC Interval 409 ms   Comprehensive Metabolic Panel    Collection Time: 06/24/25  3:09 AM    Specimen: Blood   Result Value Ref Range    Glucose 92 65 - 99 mg/dL    BUN 18.0 6.0 - 20.0 mg/dL    Creatinine 1.04 (H) 0.57 - 1.00 mg/dL    Sodium 138 136 - 145 mmol/L    Potassium 4.1 3.5 - 5.2 mmol/L    Chloride 105 98 - 107 mmol/L    CO2 23.9 22.0 - 29.0 mmol/L    Calcium 8.8 8.6 - 10.5 mg/dL    Total Protein 6.4 6.0 - 8.5 g/dL    Albumin 4.0 3.5 - 5.2 g/dL    ALT (SGPT) 9 1 - 33 U/L    AST (SGOT) 15 1 - 32 U/L    Alkaline Phosphatase 50 39 - 117 U/L    Total Bilirubin 0.3 0.0 - 1.2 mg/dL    Globulin 2.4 gm/dL    A/G Ratio 1.7 g/dL    BUN/Creatinine Ratio 17.3 7.0 - 25.0    Anion Gap 9.1 5.0 - 15.0 mmol/L    eGFR 69.4 >60.0 mL/min/1.73   hCG, Serum, Qualitative    Collection Time: 06/24/25  3:09 AM    Specimen: Blood   Result Value Ref Range    HCG Qualitative Negative Negative   BNP    Collection Time: 06/24/25  3:09 AM    Specimen: Blood   Result Value Ref Range    proBNP 57.8 0.0 - 450.0 pg/mL   High Sensitivity Troponin T    Collection Time: 06/24/25  3:09 AM    Specimen: Blood   Result Value Ref Range    HS Troponin T <6 <14 ng/L   Magnesium    Collection Time: 06/24/25  3:09 AM    Specimen: Blood   Result Value Ref Range    Magnesium 2.1 1.6 - 2.6 mg/dL   CBC Auto Differential    Collection Time: 06/24/25  3:09 AM    Specimen: Blood   Result Value Ref Range    WBC 6.36 3.40 - 10.80 10*3/mm3    RBC 4.74 3.77 - 5.28 10*6/mm3    Hemoglobin 13.3 12.0 - 15.9 g/dL    Hematocrit 40.5 34.0 - 46.6 %    MCV 85.4 79.0 - 97.0 fL    MCH 28.1 26.6 - 33.0 pg    MCHC 32.8 31.5 - 35.7 g/dL    RDW 15.7 (H) 12.3 - 15.4 %    RDW-SD 48.9 37.0 - 54.0 fl    MPV 10.0 6.0 - 12.0 fL    Platelets 199 140 - 450 10*3/mm3    Neutrophil % 48.3 42.7 - 76.0 %    Lymphocyte % 40.1 19.6 - 45.3 %    Monocyte % 9.1 5.0 - 12.0 %    Eosinophil %  1.7 0.3 - 6.2 %    Basophil % 0.5 0.0 - 1.5 %    Immature Grans % 0.3 0.0 - 0.5 %    Neutrophils, Absolute 3.07 1.70 - 7.00 10*3/mm3    Lymphocytes, Absolute 2.55 0.70 - 3.10 10*3/mm3    Monocytes, Absolute 0.58 0.10 - 0.90 10*3/mm3    Eosinophils, Absolute 0.11 0.00 - 0.40 10*3/mm3    Basophils, Absolute 0.03 0.00 - 0.20 10*3/mm3    Immature Grans, Absolute 0.02 0.00 - 0.05 10*3/mm3    nRBC 0.0 0.0 - 0.2 /100 WBC   Protime-INR    Collection Time: 06/24/25  3:34 AM    Specimen: Blood   Result Value Ref Range    Protime 16.1 (H) 11.7 - 14.2 Seconds    INR 1.30 (H) 0.90 - 1.10   aPTT    Collection Time: 06/24/25  3:34 AM    Specimen: Blood   Result Value Ref Range    PTT 32.6 22.7 - 35.4 seconds   D-dimer, Quantitative    Collection Time: 06/24/25  3:34 AM    Specimen: Blood   Result Value Ref Range    D-Dimer, Quantitative <0.27 0.00 - 0.50 MCGFEU/mL   High Sensitivity Troponin T 1Hr    Collection Time: 06/24/25  4:44 AM    Specimen: Blood   Result Value Ref Range    HS Troponin T <6 <14 ng/L    Troponin T Numeric Delta         I ordered the above labs and independently reviewed the results.        RADIOLOGY  CT Angiogram Chest Pulmonary Embolism  Result Date: 6/24/2025  CT ANGIOGRAM OF THE CHEST  HISTORY: Shortness of breath  COMPARISON: None available.  TECHNIQUE: Axial CT imaging was obtained through the thorax. IV contrast was administered. Three-D reformatted images were obtained.  FINDINGS: No acute pulmonary thromboembolus is seen. Examination was not optimized for assessment of the thoracic aorta. It is normal in caliber. There is no evidence of dissection. No definite coronary artery calcifications are seen. Thyroid gland, trachea, and esophagus appear unremarkable. Mediastinal lymph nodes do not appear pathologically enlarged. No acute infiltrates are identified. There is no pneumothorax or pleural effusion. Images through the upper abdomen demonstrate a probable cyst within the left hepatic lobe. No acute  osseous abnormalities are seen.      No acute intrathoracic findings.  Radiation dose reduction techniques were utilized, including automated exposure control and exposure modulation based on body size.   This report was finalized on 6/24/2025 5:22 AM by Dr. Kayley Love M.D on Workstation: [id number]      XR Chest 1 View  Result Date: 6/24/2025  SINGLE VIEW OF THE CHEST  HISTORY: Chest pain  COMPARISON: September 30, 2015  FINDINGS: Heart size is within normal limits. No pneumothorax, pleural effusion, or acute infiltrate is seen.      No acute findings.  This report was finalized on 6/24/2025 3:30 AM by Dr. Kayley Love M.D on Workstation: [id number]        I ordered the above noted radiological studies. Reviewed by me and discussed with radiologist.  See dictation for official radiology interpretation.      PROCEDURES    Procedures      MEDICATIONS GIVEN IN ER    Medications   sodium chloride 0.9 % flush 10 mL (has no administration in time range)   nitroglycerin (NITROSTAT) SL tablet 0.4 mg (has no administration in time range)   ipratropium-albuterol (DUO-NEB) nebulizer solution 3 mL (3 mL Nebulization Given 6/24/25 0337)   iopamidol (ISOVUE-370) 76 % injection 100 mL (95 mL Intravenous Given 6/24/25 0507)         ORDERS PLACED DURING THIS VISIT:  Orders Placed This Encounter   Procedures    XR Chest 1 View    CT Angiogram Chest Pulmonary Embolism    Comprehensive Metabolic Panel    Protime-INR    aPTT    hCG, Serum, Qualitative    BNP    High Sensitivity Troponin T    Magnesium    CBC Auto Differential    D-dimer, Quantitative    High Sensitivity Troponin T 1Hr    Monitor Blood Pressure    Continuous Pulse Oximetry    ECG 12 Lead Chest Pain    Insert Peripheral IV    CBC & Differential         PROGRESS, DATA ANALYSIS, CONSULTS, AND MEDICAL DECISION MAKING    All labs have been independently interpreted by me.  All radiology studies have been reviewed by me and discussed with radiologist dictating  the report.   EKG's independently viewed and interpreted by me.  Discussion below represents my analysis of pertinent findings related to patient's condition, differential diagnosis, treatment plan and final disposition.    Differential diagnosis includes but is not limited to:  Asthma, COPD, pneumonia, pulmonary embolus, acute respiratory distress syndrome, pneumothorax, pleural effusion, pulmonary fibrosis, congestive heart failure, myocardial infarction, DKA, uremia, acidosis, sepsis, anemia, drug related, hyperventilation, CNS disease  .    Clinical Scores:              ED Course as of 06/24/25 0631   Tue Jun 24, 2025   0306 EKG          EKG time: 0304  Rhythm/Rate: Sinus bradycardia rate 47  P waves and VA: Normal  QRS, axis: Narrow regular  ST and T waves: Normal    Interpreted Contemporaneously by me, independently viewed [TJ]   0343 HS Troponin T: <6 [TJ]   0343 WBC: 6.36 [TJ]   0343 Hemoglobin: 13.3 [TJ]   0343 Platelets: 199 [TJ]   0343 Creatinine(!): 1.04 [TJ]   0343 Anion Gap: 9.1 [TJ]   0418 D-Dimer, Quant: <0.27 [TJ]   0522 HS Troponin T: <6 [TJ]   0631 Reassuring.  Patient is breathing well.  I have given strong return instructions.  Patient is agreeable for discharge. [TJ]      ED Course User Index  [TJ] Joe Damian MD               PPE: The patient wore a mask and I wore an N95 mask throughout the entire patient encounter.       AS OF 06:31 EDT VITALS:    BP - 100/61  HR - (!) 49  TEMP - 98 °F (36.7 °C) (Oral)  O2 SATS - 100%        DIAGNOSIS  Final diagnoses:   Chronic thoracic back pain, unspecified back pain laterality   Dyspnea, unspecified type         DISPOSITION  ED Disposition       ED Disposition   Discharge    Condition   Stable    Comment   --                  Note Disclaimer: At [facility], we believe that sharing information builds trust and better relationships. You are receiving this note because you recently visited [facility]. It is possible you will see [facility]  information before a provider has talked with you about it. This kind of information can be easy to misunderstand. To help you fully understand what it means for your health, we urge you to discuss this note with your provider.         Joe Damian MD  06/24/25 0631       Joe Damian MD  06/24/25 0631

## (undated) DEVICE — BITEBLOCK OMNI BLOC

## (undated) DEVICE — TBG 02 CRUSH RESIST LF CLR 7FT

## (undated) DEVICE — GLV SURG BIOGEL LTX PF 6 1/2

## (undated) DEVICE — PK ARTHSCP 40

## (undated) DEVICE — ABL APOLLO RF M/PRT 50D

## (undated) DEVICE — TUBING, SUCTION, 1/4" X 10', STRAIGHT: Brand: MEDLINE

## (undated) DEVICE — GLV SURG SENSICARE POLYISPRN W/ALOE PF LF 6.5 GRN STRL

## (undated) DEVICE — GOWN,SIRUS,NON REINFRCD,LARGE,SET IN SL: Brand: MEDLINE

## (undated) DEVICE — DISPOSABLE TOURNIQUET CUFF SINGLE BLADDER, SINGLE PORT AND QUICK CONNECT CONNECTOR: Brand: COLOR CUFF

## (undated) DEVICE — Device: Brand: DEFENDO AIR/WATER/SUCTION AND BIOPSY VALVE

## (undated) DEVICE — BNDG ELAS ELITE V/CLOSE 4IN 5YD LF STRL

## (undated) DEVICE — SKIN PREP TRAY W/CHG: Brand: MEDLINE INDUSTRIES, INC.

## (undated) DEVICE — FRCP BX RADJAW4 NDL 2.8 240CM LG OG BX40

## (undated) DEVICE — MSK PROC CURAPLEX O2 2/ADAPT 7FT

## (undated) DEVICE — KT ORCA ORCAPOD DISP STRL

## (undated) DEVICE — SENSR O2 OXIMAX FNGR A/ 18IN NONSTR

## (undated) DEVICE — UNDERCAST PADDING: Brand: DEROYAL

## (undated) DEVICE — BLD DISSCT COOL CUT SJ CRVD 4MM 13CM

## (undated) DEVICE — ADAPT CLN BIOGUARD AIR/H2O DISP

## (undated) DEVICE — LN SMPL CO2 SHTRM SD STREAM W/M LUER

## (undated) DEVICE — SUT ETHLN 3/0 PS1 18IN 1663H

## (undated) DEVICE — DRSNG WND GZ CURAD OIL EMULSION 3X3IN STRL

## (undated) DEVICE — CANN NASL CO2 TRULINK W/O2 A/